# Patient Record
Sex: FEMALE | Race: WHITE | HISPANIC OR LATINO | URBAN - METROPOLITAN AREA
[De-identification: names, ages, dates, MRNs, and addresses within clinical notes are randomized per-mention and may not be internally consistent; named-entity substitution may affect disease eponyms.]

---

## 2018-03-08 ENCOUNTER — APPOINTMENT (RX ONLY)
Dept: URBAN - METROPOLITAN AREA CLINIC 31 | Facility: CLINIC | Age: 33
Setting detail: DERMATOLOGY
End: 2018-03-08

## 2018-03-08 DIAGNOSIS — B35.1 TINEA UNGUIUM: ICD-10-CM

## 2018-03-08 DIAGNOSIS — L70.0 ACNE VULGARIS: ICD-10-CM

## 2018-03-08 DIAGNOSIS — D22 MELANOCYTIC NEVI: ICD-10-CM

## 2018-03-08 PROBLEM — D22.39 MELANOCYTIC NEVI OF OTHER PARTS OF FACE: Status: ACTIVE | Noted: 2018-03-08

## 2018-03-08 PROBLEM — D22.4 MELANOCYTIC NEVI OF SCALP AND NECK: Status: ACTIVE | Noted: 2018-03-08

## 2018-03-08 PROCEDURE — ? PRESCRIPTION

## 2018-03-08 PROCEDURE — 99214 OFFICE O/P EST MOD 30 MIN: CPT

## 2018-03-08 PROCEDURE — ? COUNSELING

## 2018-03-08 RX ORDER — CICLOPIROX 80 MG/ML
SOLUTION TOPICAL QHS
Qty: 6.6 | Refills: 11 | Status: ERX | COMMUNITY
Start: 2018-03-08

## 2018-03-08 RX ORDER — CLINDAMYCIN PHOSPHATE 10 MG/G
GEL TOPICAL
Qty: 60 | Refills: 11 | Status: ERX | COMMUNITY
Start: 2018-03-08

## 2018-03-08 RX ADMIN — CICLOPIROX: 80 SOLUTION TOPICAL at 17:05

## 2018-03-08 RX ADMIN — CLINDAMYCIN PHOSPHATE: 10 GEL TOPICAL at 17:06

## 2018-03-08 ASSESSMENT — LOCATION DETAILED DESCRIPTION DERM
LOCATION DETAILED: RIGHT INFERIOR ANTERIOR NECK
LOCATION DETAILED: RIGHT SUPERIOR LATERAL BUCCAL CHEEK
LOCATION DETAILED: RIGHT GREAT TOENAIL
LOCATION DETAILED: LEFT GREAT TOENAIL
LOCATION DETAILED: LEFT INFERIOR CENTRAL MALAR CHEEK
LOCATION DETAILED: RIGHT SUPERIOR UPPER BACK

## 2018-03-08 ASSESSMENT — LOCATION SIMPLE DESCRIPTION DERM
LOCATION SIMPLE: LEFT GREAT TOE
LOCATION SIMPLE: RIGHT ANTERIOR NECK
LOCATION SIMPLE: RIGHT UPPER BACK
LOCATION SIMPLE: RIGHT CHEEK
LOCATION SIMPLE: RIGHT GREAT TOE
LOCATION SIMPLE: LEFT CHEEK

## 2018-03-08 ASSESSMENT — LOCATION ZONE DERM
LOCATION ZONE: TRUNK
LOCATION ZONE: NECK
LOCATION ZONE: TOENAIL
LOCATION ZONE: FACE

## 2018-03-08 NOTE — PROCEDURE: COUNSELING
Detail Level: Zone
Erythromycin Counseling:  I discussed with the patient the risks of erythromycin including but not limited to GI upset, allergic reaction, drug rash, diarrhea, increase in liver enzymes, and yeast infections.
Benzoyl Peroxide Pregnancy And Lactation Text: This medication is Pregnancy Category C. It is unknown if benzoyl peroxide is excreted in breast milk.
Topical Sulfur Applications Counseling: Topical Sulfur Counseling: Patient counseled that this medication may cause skin irritation or allergic reactions.  In the event of skin irritation, the patient was advised to reduce the amount of the drug applied or use it less frequently.   The patient verbalized understanding of the proper use and possible adverse effects of topical sulfur application.  All of the patient's questions and concerns were addressed.
Dapsone Counseling: I discussed with the patient the risks of dapsone including but not limited to hemolytic anemia, agranulocytosis, rashes, methemoglobinemia, kidney failure, peripheral neuropathy, headaches, GI upset, and liver toxicity.  Patients who start dapsone require monitoring including baseline LFTs and weekly CBCs for the first month, then every month thereafter.  The patient verbalized understanding of the proper use and possible adverse effects of dapsone.  All of the patient's questions and concerns were addressed.
Birth Control Pills Pregnancy And Lactation Text: This medication should be avoided if pregnant and for the first 30 days post-partum.
Minocycline Pregnancy And Lactation Text: This medication is Pregnancy Category D and not consider safe during pregnancy. It is also excreted in breast milk.
Dapsone Pregnancy And Lactation Text: This medication is Pregnancy Category C and is not considered safe during pregnancy or breast feeding.
Benzoyl Peroxide Counseling: Patient counseled that medicine may cause skin irritation and bleach clothing.  In the event of skin irritation, the patient was advised to reduce the amount of the drug applied or use it less frequently.   The patient verbalized understanding of the proper use and possible adverse effects of benzoyl peroxide.  All of the patient's questions and concerns were addressed.
Spironolactone Counseling: Patient advised regarding risks of diarrhea, abdominal pain, hyperkalemia, birth defects (for female patients), liver toxicity and renal toxicity. The patient may need blood work to monitor liver and kidney function and potassium levels while on therapy. The patient verbalized understanding of the proper use and possible adverse effects of spironolactone.  All of the patient's questions and concerns were addressed.
Topical Clindamycin Pregnancy And Lactation Text: This medication is Pregnancy Category B and is considered safe during pregnancy. It is unknown if it is excreted in breast milk.
Doxycycline Pregnancy And Lactation Text: This medication is Pregnancy Category D and not consider safe during pregnancy. It is also excreted in breast milk but is considered safe for shorter treatment courses.
Isotretinoin Pregnancy And Lactation Text: This medication is Pregnancy Category X and is considered extremely dangerous during pregnancy. It is unknown if it is excreted in breast milk.
Azithromycin Pregnancy And Lactation Text: This medication is considered safe during pregnancy and is also secreted in breast milk.
Doxycycline Counseling:  Patient counseled regarding possible photosensitivity and increased risk for sunburn.  Patient instructed to avoid sunlight, if possible.  When exposed to sunlight, patients should wear protective clothing, sunglasses, and sunscreen.  The patient was instructed to call the office immediately if the following severe adverse effects occur:  hearing changes, easy bruising/bleeding, severe headache, or vision changes.  The patient verbalized understanding of the proper use and possible adverse effects of doxycycline.  All of the patient's questions and concerns were addressed.
Birth Control Pills Counseling: Birth Control Pill Counseling: I discussed with the patient the potential side effects of OCPs including but not limited to increased risk of stroke, heart attack, thrombophlebitis, deep venous thrombosis, hepatic adenomas, breast changes, GI upset, headaches, and depression.  The patient verbalized understanding of the proper use and possible adverse effects of OCPs. All of the patient's questions and concerns were addressed.
Bactrim Pregnancy And Lactation Text: This medication is Pregnancy Category D and is known to cause fetal risk.  It is also excreted in breast milk.
High Dose Vitamin A Counseling: Side effects reviewed, pt to contact office should one occur.
Topical Retinoid Pregnancy And Lactation Text: This medication is Pregnancy Category C. It is unknown if this medication is excreted in breast milk.
Topical Clindamycin Counseling: Patient counseled that this medication may cause skin irritation or allergic reactions.  In the event of skin irritation, the patient was advised to reduce the amount of the drug applied or use it less frequently.   The patient verbalized understanding of the proper use and possible adverse effects of clindamycin.  All of the patient's questions and concerns were addressed.
Azithromycin Counseling:  I discussed with the patient the risks of azithromycin including but not limited to GI upset, allergic reaction, drug rash, diarrhea, and yeast infections.
Topical Retinoid counseling:  Patient advised to apply a pea-sized amount only at bedtime and wait 30 minutes after washing their face before applying.  If too drying, patient may add a non-comedogenic moisturizer. The patient verbalized understanding of the proper use and possible adverse effects of retinoids.  All of the patient's questions and concerns were addressed.
Bactrim Counseling:  I discussed with the patient the risks of sulfa antibiotics including but not limited to GI upset, allergic reaction, drug rash, diarrhea, dizziness, photosensitivity, and yeast infections.  Rarely, more serious reactions can occur including but not limited to aplastic anemia, agranulocytosis, methemoglobinemia, blood dyscrasias, liver or kidney failure, lung infiltrates or desquamative/blistering drug rashes.
Minocycline Counseling: Patient advised regarding possible photosensitivity and discoloration of the teeth, skin, lips, tongue and gums.  Patient instructed to avoid sunlight, if possible.  When exposed to sunlight, patients should wear protective clothing, sunglasses, and sunscreen.  The patient was instructed to call the office immediately if the following severe adverse effects occur:  hearing changes, easy bruising/bleeding, severe headache, or vision changes.  The patient verbalized understanding of the proper use and possible adverse effects of minocycline.  All of the patient's questions and concerns were addressed.
Erythromycin Pregnancy And Lactation Text: This medication is Pregnancy Category B and is considered safe during pregnancy. It is also excreted in breast milk.
Isotretinoin Counseling: Patient should get monthly blood tests, not donate blood, not drive at night if vision affected, not share medication, and not undergo elective surgery for 6 months after tx completed. Side effects reviewed, pt to contact office should one occur.
Tazorac Counseling:  Patient advised that medication is irritating and drying.  Patient may need to apply sparingly and wash off after an hour before eventually leaving it on overnight.  The patient verbalized understanding of the proper use and possible adverse effects of tazorac.  All of the patient's questions and concerns were addressed.
Tazorac Pregnancy And Lactation Text: This medication is not safe during pregnancy. It is unknown if this medication is excreted in breast milk.
High Dose Vitamin A Pregnancy And Lactation Text: High dose vitamin A therapy is contraindicated during pregnancy and breast feeding.
Tetracycline Counseling: Patient counseled regarding possible photosensitivity and increased risk for sunburn.  Patient instructed to avoid sunlight, if possible.  When exposed to sunlight, patients should wear protective clothing, sunglasses, and sunscreen.  The patient was instructed to call the office immediately if the following severe adverse effects occur:  hearing changes, easy bruising/bleeding, severe headache, or vision changes.  The patient verbalized understanding of the proper use and possible adverse effects of tetracycline.  All of the patient's questions and concerns were addressed. Patient understands to avoid pregnancy while on therapy due to potential birth defects.
Spironolactone Pregnancy And Lactation Text: This medication can cause feminization of the male fetus and should be avoided during pregnancy. The active metabolite is also found in breast milk.
Topical Sulfur Applications Pregnancy And Lactation Text: This medication is Pregnancy Category C and has an unknown safety profile during pregnancy. It is unknown if this topical medication is excreted in breast milk.

## 2018-06-12 ENCOUNTER — NON-PROVIDER VISIT (OUTPATIENT)
Dept: NEUROLOGY | Facility: MEDICAL CENTER | Age: 33
End: 2018-06-12
Payer: MEDICAID

## 2018-06-12 DIAGNOSIS — G56.03 CARPAL TUNNEL SYNDROME, BILATERAL: ICD-10-CM

## 2018-06-12 DIAGNOSIS — R20.0 NUMBNESS: ICD-10-CM

## 2018-06-12 DIAGNOSIS — R20.2 PARESTHESIA: ICD-10-CM

## 2018-06-12 PROCEDURE — 95910 NRV CNDJ TEST 7-8 STUDIES: CPT | Performed by: SPECIALIST

## 2018-06-12 PROCEDURE — 95886 MUSC TEST DONE W/N TEST COMP: CPT | Performed by: SPECIALIST

## 2018-06-12 NOTE — PROCEDURES
DATE OF SERVICE:  2018    DATE OF STUDY:  2018    ORDERING PROVIDER:  KASEY Mcgill    SUMMARY:  Nerve conduction studies of the right upper extremity revealed the   followin.  Right median motor distal latency, amplitude, and conduction velocity are   within normal limits.  2.  Right median sensory distal latency and amplitude are within normal   limits.  3.  Right median F-wave exhibits a normal distal latency.  4.  Right ulnar motor distal latency, amplitude, and conduction velocity are   within normal limits.  5.  Right ulnar sensory distal latency and amplitude are within normal limits.    Needle examination of selected muscles studied in the right upper extremity   reveals no acute or chronic denervation changes.  Muscles studied were right   deltoid, biceps, triceps, first dorsal interosseous, and abductor pollicis   brevis.    Nerve conduction studies of the left upper extremity revealed the followin.  Left median motor distal latency, amplitude, and conduction velocity are   within normal limits.  2.  Left median sensory distal latency and amplitude are within normal limits.  3.  Left median F-wave exhibits a normal distal latency.  4.  Left ulnar motor distal latency, amplitude, and conduction velocity are   within normal limits.  5.  Left ulnar sensory distal latency and amplitude are within normal limits.    Needle examination of selected muscles studied in the left upper extremity   reveals no acute or chronic denervation changes.  Muscles studied were left   deltoid, biceps, triceps, first dorsal interosseous, and abductor pollicis   brevis.  Nerve Conduction Studies     Stim Site NR Peak (ms) Norm Peak (ms) O-P Amp (µV) Norm O-P Amp Site1 Site2 Delta-P (ms) Dist (cm) Burke (m/s) Norm Burke (m/s)   Left Median Anti Sensory (2nd Digit)   Wrist    2.6 <3.4 75.6 >20 Wrist 2nd Digit 2.6 14.0 54 >50   Right Median Anti Sensory (2nd Digit)   Wrist    2.8 <3.4 64.1 >20 Wrist 2nd Digit  2.8 14.0 50 >50   Left Ulnar Anti Sensory (5th Digit)   Wrist    2.9 <3.1 72.1 >12 Wrist 5th Digit 2.9 14.0 *48 >50   Right Ulnar Anti Sensory (5th Digit)   Wrist    3.1 >3.1 68.4 >12 Wrist 5th Digit 3.1 14.0 *45 >50        Stim Site NR Onset (ms) Norm Onset (ms) O-P Amp (mV) Norm O-P Amp Site1 Site2 Delta-0 (ms) Dist (cm) Burke (m/s) Norm Burke (m/s)   Left Median Motor (Abd Poll Brev)   Wrist    2.7 <3.9 13.0 >6 Elbow Wrist 3.6 23.0 64 >50   Elbow    6.3  13.0          Right Median Motor (Abd Poll Brev)   Wrist    3.0 <3.9 8.4 >6 Elbow Wrist 3.7 26.0 70 >50   Elbow    6.7  8.2          Left Ulnar Motor (Abd Dig Min)   Wrist    2.3 <3.1 9.5 >7 B Elbow Wrist 2.6 18.0 69 >50   B Elbow    4.9  9.1  A Elbow B Elbow 1.3 10.0 77    A Elbow    6.2  9.2          Right Ulnar Motor (Abd Dig Min)   Wrist    2.3 <3.1 9.3 >7 B Elbow Wrist 2.6 16.5 63 >50   B Elbow    4.9  8.5  A Elbow B Elbow 1.2 10.0 83    A Elbow    6.1  8.3            F Wave Studies     NR F-Lat (ms) Lat Norm (ms)   Left Median (Abd Poll Brev)      22.66 <31   Right Median (Abd Poll Brev)      22.66 <31                               Electromyography     Side Muscle Nerve Root Ins Act Fibs Psw Amp Dur Poly Recrt Int Pat Comment   Left Deltoid Axillary C5-6 Nml Nml Nml Nml Nml 0 Nml Nml    Left Biceps Musculocut C5-6 Nml Nml Nml Nml Nml 0 Nml Nml    Left Triceps Radial C6-7-8 Nml Nml Nml Nml Nml 0 Nml Nml    Left Abd Poll Brev Median C8-T1 Nml Nml Nml Nml Nml 0 Nml Nml    Left 1stDorInt Ulnar C8-T1 Nml Nml Nml Nml Nml 0 Nml Nml    Right Deltoid Axillary C5-6 Nml Nml Nml Nml Nml 0 Nml Nml    Right Biceps Musculocut C5-6 Nml Nml Nml Nml Nml 0 Nml Nml    Right Triceps Radial C6-7-8 Nml Nml Nml Nml Nml 0 Nml Nml    Right Abd Poll Brev Median C8-T1 Nml Nml Nml Nml Nml 0 Nml Nml    Right 1stDorInt Ulnar C8-T1 Nml Nml Nml Nml Nml 0 Nml Nml        IMPRESSION:  Normal EMG and nerve conduction studies, bilateral upper   extremities.        ____________________________________     G MD ALEX BEDOYA    DD:  06/12/2018 14:00:33  DT:  06/12/2018 14:27:56    D#:  [id number]  Job#:  [id number]

## 2019-07-22 ENCOUNTER — APPOINTMENT (OUTPATIENT)
Dept: PULMONOLOGY | Facility: MEDICAL CENTER | Age: 34
End: 2019-07-22
Payer: MEDICAID

## 2019-08-12 ENCOUNTER — HOSPITAL ENCOUNTER (OUTPATIENT)
Dept: PULMONOLOGY | Facility: MEDICAL CENTER | Age: 34
End: 2019-08-12
Attending: PHYSICIAN ASSISTANT
Payer: MEDICAID

## 2019-08-12 PROCEDURE — 94060 EVALUATION OF WHEEZING: CPT | Mod: 26 | Performed by: INTERNAL MEDICINE

## 2019-08-12 PROCEDURE — 94726 PLETHYSMOGRAPHY LUNG VOLUMES: CPT

## 2019-08-12 PROCEDURE — 94729 DIFFUSING CAPACITY: CPT

## 2019-08-12 PROCEDURE — 94060 EVALUATION OF WHEEZING: CPT

## 2019-08-12 PROCEDURE — 94726 PLETHYSMOGRAPHY LUNG VOLUMES: CPT | Mod: 26 | Performed by: INTERNAL MEDICINE

## 2019-08-12 PROCEDURE — 94729 DIFFUSING CAPACITY: CPT | Mod: 26 | Performed by: INTERNAL MEDICINE

## 2019-08-12 ASSESSMENT — PULMONARY FUNCTION TESTS
FVC_PERCENT_PREDICTED: 97
FEV1: 2.82
FEV1: 2.76
FVC: 3.18
FEV1/FVC: 88
FEV1_LLN: 2.32
FEV1/FVC_PERCENT_PREDICTED: 104
FEV1_PERCENT_PREDICTED: 101
FEV1/FVC: 88
FEV1/FVC_PREDICTED: 85
FEV1/FVC_PERCENT_PREDICTED: 103
FEV1_PERCENT_PREDICTED: 99
FEV1_PERCENT_CHANGE: 2
FEV1/FVC_PERCENT_LLN: 71
FVC_LLN: 2.73
FVC_LLN: 2.73
FEV1/FVC_PERCENT_PREDICTED: 85
FEV1_LLN: 2.32
FEV1/FVC_PERCENT_PREDICTED: 104
FEV1/FVC_PERCENT_PREDICTED: 103
FVC_PREDICTED: 3.27
FEV1_PERCENT_CHANGE: 0
FEV1/FVC: 88.68
FVC: 3.15
FVC_PERCENT_PREDICTED: 96
FEV1/FVC_PERCENT_CHANGE: 1
FEV1/FVC: 89
FEV1/FVC_PERCENT_LLN: 71
FEV1_PREDICTED: 2.78

## 2019-08-23 NOTE — PROCEDURES
PULMONARY FUNCTION TEST INTERPRETATION    REQUESTING PROVIDER:  NINI Barrett    REASON FOR REQUEST:  Shortness of breath.    FINDINGS:  1.  Acceptable and reproducible.  2.  FEV1 2.76 liters (99%), FVC 3.15 liters (96%), ratio 88%.  3.  Flow volume loops are normal appearing.  4.  TLC normal at 5.09 liters (114%).  5.  DLCO corrected for hemoglobin, elevated at 13.21 mL/min/mmHg (154%).    IMPRESSION:  Normal spirometry.  No response to bronchodilator.  Evidence of   air trapping and elevated DLCO.  Reduced expiratory reserve volume.  This is   all related to elevated BMI.  Clinically correlate.       ____________________________________     MD SHER Cole / MAURIZIO    DD:  08/23/2019 08:56:07  DT:  08/23/2019 09:46:56    D#:  [id number]  Job#:  [id number]    cc: Addie TERRAZAS

## 2022-03-09 ENCOUNTER — APPOINTMENT (OUTPATIENT)
Dept: ADMISSIONS | Facility: MEDICAL CENTER | Age: 37
DRG: 621 | End: 2022-03-09
Payer: MEDICAID

## 2022-03-14 ENCOUNTER — PRE-ADMISSION TESTING (OUTPATIENT)
Dept: ADMISSIONS | Facility: MEDICAL CENTER | Age: 37
DRG: 621 | End: 2022-03-14
Attending: COLON & RECTAL SURGERY
Payer: MEDICAID

## 2022-03-14 DIAGNOSIS — Z01.812 PRE-OPERATIVE LABORATORY EXAMINATION: ICD-10-CM

## 2022-03-14 LAB — COVID ORDER STATUS COVID19: NORMAL

## 2022-03-14 PROCEDURE — U0005 INFEC AGEN DETEC AMPLI PROBE: HCPCS

## 2022-03-14 PROCEDURE — U0003 INFECTIOUS AGENT DETECTION BY NUCLEIC ACID (DNA OR RNA); SEVERE ACUTE RESPIRATORY SYNDROME CORONAVIRUS 2 (SARS-COV-2) (CORONAVIRUS DISEASE [COVID-19]), AMPLIFIED PROBE TECHNIQUE, MAKING USE OF HIGH THROUGHPUT TECHNOLOGIES AS DESCRIBED BY CMS-2020-01-R: HCPCS

## 2022-03-14 RX ORDER — EFINACONAZOLE 100 MG/ML
1 SOLUTION TOPICAL DAILY
COMMUNITY
Start: 2022-01-25

## 2022-03-14 RX ORDER — BETAMETHASONE DIPROPIONATE 0.05 %
1 OINTMENT (GRAM) TOPICAL EVERY EVENING
COMMUNITY

## 2022-03-14 RX ORDER — MEDROXYPROGESTERONE ACETATE 150 MG/ML
50 INJECTION, SUSPENSION INTRAMUSCULAR
COMMUNITY
Start: 2022-02-09

## 2022-03-14 RX ORDER — CLOBETASOL PROPIONATE 0.46 MG/ML
SOLUTION TOPICAL PRN
COMMUNITY

## 2022-03-14 RX ORDER — TRIAMCINOLONE ACETONIDE 5 MG/G
CREAM TOPICAL
Status: ON HOLD | COMMUNITY
End: 2022-03-18

## 2022-03-15 LAB
SARS-COV-2 RNA RESP QL NAA+PROBE: NOTDETECTED
SPECIMEN SOURCE: NORMAL

## 2022-03-16 ENCOUNTER — PRE-ADMISSION TESTING (OUTPATIENT)
Dept: ADMISSIONS | Facility: MEDICAL CENTER | Age: 37
DRG: 621 | End: 2022-03-16
Attending: COLON & RECTAL SURGERY
Payer: MEDICAID

## 2022-03-16 DIAGNOSIS — Z01.812 PRE-OPERATIVE LABORATORY EXAMINATION: ICD-10-CM

## 2022-03-16 LAB
25(OH)D3 SERPL-MCNC: 28 NG/ML (ref 30–100)
ALBUMIN SERPL BCP-MCNC: 4.6 G/DL (ref 3.2–4.9)
ALBUMIN/GLOB SERPL: 1.3 G/DL
ALP SERPL-CCNC: 92 U/L (ref 30–99)
ALT SERPL-CCNC: 25 U/L (ref 2–50)
ANION GAP SERPL CALC-SCNC: 11 MMOL/L (ref 7–16)
AST SERPL-CCNC: 7 U/L (ref 12–45)
BASOPHILS # BLD AUTO: 0.5 % (ref 0–1.8)
BASOPHILS # BLD: 0.03 K/UL (ref 0–0.12)
BILIRUB SERPL-MCNC: 0.5 MG/DL (ref 0.1–1.5)
BUN SERPL-MCNC: 18 MG/DL (ref 8–22)
CALCIUM SERPL-MCNC: 9.5 MG/DL (ref 8.5–10.5)
CHLORIDE SERPL-SCNC: 103 MMOL/L (ref 96–112)
CHOLEST SERPL-MCNC: 160 MG/DL (ref 100–199)
CO2 SERPL-SCNC: 24 MMOL/L (ref 20–33)
CREAT SERPL-MCNC: 0.58 MG/DL (ref 0.5–1.4)
EOSINOPHIL # BLD AUTO: 0.12 K/UL (ref 0–0.51)
EOSINOPHIL NFR BLD: 1.9 % (ref 0–6.9)
ERYTHROCYTE [DISTWIDTH] IN BLOOD BY AUTOMATED COUNT: 42.1 FL (ref 35.9–50)
FERRITIN SERPL-MCNC: 147 NG/ML (ref 10–291)
FOLATE SERPL-MCNC: 7 NG/ML
GFR SERPLBLD CREATININE-BSD FMLA CKD-EPI: 121 ML/MIN/1.73 M 2
GLOBULIN SER CALC-MCNC: 3.5 G/DL (ref 1.9–3.5)
GLUCOSE SERPL-MCNC: 105 MG/DL (ref 65–99)
HCG SERPL QL: NEGATIVE
HCT VFR BLD AUTO: 46.6 % (ref 37–47)
HDLC SERPL-MCNC: 36 MG/DL
HGB BLD-MCNC: 15.3 G/DL (ref 12–16)
IMM GRANULOCYTES # BLD AUTO: 0.02 K/UL (ref 0–0.11)
IMM GRANULOCYTES NFR BLD AUTO: 0.3 % (ref 0–0.9)
INR PPP: 0.98 (ref 0.87–1.13)
IRON SATN MFR SERPL: 18 % (ref 15–55)
IRON SERPL-MCNC: 55 UG/DL (ref 40–170)
LDLC SERPL CALC-MCNC: 104 MG/DL
LYMPHOCYTES # BLD AUTO: 2.16 K/UL (ref 1–4.8)
LYMPHOCYTES NFR BLD: 33.5 % (ref 22–41)
MCH RBC QN AUTO: 29.5 PG (ref 27–33)
MCHC RBC AUTO-ENTMCNC: 32.8 G/DL (ref 33.6–35)
MCV RBC AUTO: 89.8 FL (ref 81.4–97.8)
MONOCYTES # BLD AUTO: 0.42 K/UL (ref 0–0.85)
MONOCYTES NFR BLD AUTO: 6.5 % (ref 0–13.4)
NEUTROPHILS # BLD AUTO: 3.69 K/UL (ref 2–7.15)
NEUTROPHILS NFR BLD: 57.3 % (ref 44–72)
NRBC # BLD AUTO: 0 K/UL
NRBC BLD-RTO: 0 /100 WBC
PLATELET # BLD AUTO: 274 K/UL (ref 164–446)
PMV BLD AUTO: 10.1 FL (ref 9–12.9)
POTASSIUM SERPL-SCNC: 4.2 MMOL/L (ref 3.6–5.5)
PREALB SERPL-MCNC: 23.1 MG/DL (ref 18–38)
PROT SERPL-MCNC: 8.1 G/DL (ref 6–8.2)
PROTHROMBIN TIME: 12.7 SEC (ref 12–14.6)
RBC # BLD AUTO: 5.19 M/UL (ref 4.2–5.4)
SODIUM SERPL-SCNC: 138 MMOL/L (ref 135–145)
TIBC SERPL-MCNC: 306 UG/DL (ref 250–450)
TRANSFERRIN SERPL-MCNC: 256 MG/DL (ref 200–370)
TRIGL SERPL-MCNC: 98 MG/DL (ref 0–149)
UIBC SERPL-MCNC: 251 UG/DL (ref 110–370)
VIT B12 SERPL-MCNC: 714 PG/ML (ref 211–911)
WBC # BLD AUTO: 6.4 K/UL (ref 4.8–10.8)

## 2022-03-16 PROCEDURE — 36415 COLL VENOUS BLD VENIPUNCTURE: CPT

## 2022-03-16 PROCEDURE — 84703 CHORIONIC GONADOTROPIN ASSAY: CPT

## 2022-03-16 PROCEDURE — 84425 ASSAY OF VITAMIN B-1: CPT

## 2022-03-16 PROCEDURE — 80053 COMPREHEN METABOLIC PANEL: CPT

## 2022-03-16 PROCEDURE — 82306 VITAMIN D 25 HYDROXY: CPT

## 2022-03-16 PROCEDURE — 84134 ASSAY OF PREALBUMIN: CPT

## 2022-03-16 PROCEDURE — 84207 ASSAY OF VITAMIN B-6: CPT

## 2022-03-16 PROCEDURE — 85610 PROTHROMBIN TIME: CPT

## 2022-03-16 PROCEDURE — 84466 ASSAY OF TRANSFERRIN: CPT

## 2022-03-16 PROCEDURE — 83540 ASSAY OF IRON: CPT

## 2022-03-16 PROCEDURE — 82728 ASSAY OF FERRITIN: CPT

## 2022-03-16 PROCEDURE — 82746 ASSAY OF FOLIC ACID SERUM: CPT

## 2022-03-16 PROCEDURE — 82607 VITAMIN B-12: CPT

## 2022-03-16 PROCEDURE — 84630 ASSAY OF ZINC: CPT

## 2022-03-16 PROCEDURE — 80061 LIPID PANEL: CPT

## 2022-03-16 PROCEDURE — 85025 COMPLETE CBC W/AUTO DIFF WBC: CPT

## 2022-03-16 PROCEDURE — 83550 IRON BINDING TEST: CPT

## 2022-03-18 ENCOUNTER — ANESTHESIA (OUTPATIENT)
Dept: SURGERY | Facility: MEDICAL CENTER | Age: 37
DRG: 621 | End: 2022-03-18
Payer: MEDICAID

## 2022-03-18 ENCOUNTER — HOSPITAL ENCOUNTER (INPATIENT)
Facility: MEDICAL CENTER | Age: 37
LOS: 2 days | DRG: 621 | End: 2022-03-20
Attending: COLON & RECTAL SURGERY | Admitting: COLON & RECTAL SURGERY
Payer: MEDICAID

## 2022-03-18 ENCOUNTER — ANESTHESIA EVENT (OUTPATIENT)
Dept: SURGERY | Facility: MEDICAL CENTER | Age: 37
DRG: 621 | End: 2022-03-18
Payer: MEDICAID

## 2022-03-18 PROBLEM — E66.01 MORBID OBESITY (HCC): Status: ACTIVE | Noted: 2022-03-18

## 2022-03-18 LAB
PATHOLOGY CONSULT NOTE: NORMAL
ZINC BLD-MCNC: 772.8 UG/DL (ref 440–860)

## 2022-03-18 PROCEDURE — 160041 HCHG SURGERY MINUTES - EA ADDL 1 MIN LEVEL 4: Performed by: COLON & RECTAL SURGERY

## 2022-03-18 PROCEDURE — 700111 HCHG RX REV CODE 636 W/ 250 OVERRIDE (IP): Performed by: ANESTHESIOLOGY

## 2022-03-18 PROCEDURE — 700102 HCHG RX REV CODE 250 W/ 637 OVERRIDE(OP): Performed by: ANESTHESIOLOGY

## 2022-03-18 PROCEDURE — 160009 HCHG ANES TIME/MIN: Performed by: COLON & RECTAL SURGERY

## 2022-03-18 PROCEDURE — 700111 HCHG RX REV CODE 636 W/ 250 OVERRIDE (IP): Performed by: PHYSICIAN ASSISTANT

## 2022-03-18 PROCEDURE — 502570 HCHG PACK, GASTRIC BANDING: Performed by: COLON & RECTAL SURGERY

## 2022-03-18 PROCEDURE — 160036 HCHG PACU - EA ADDL 30 MINS PHASE I: Performed by: COLON & RECTAL SURGERY

## 2022-03-18 PROCEDURE — 700102 HCHG RX REV CODE 250 W/ 637 OVERRIDE(OP): Performed by: COLON & RECTAL SURGERY

## 2022-03-18 PROCEDURE — 501570 HCHG TROCAR, SEPARATOR: Performed by: COLON & RECTAL SURGERY

## 2022-03-18 PROCEDURE — 160029 HCHG SURGERY MINUTES - 1ST 30 MINS LEVEL 4: Performed by: COLON & RECTAL SURGERY

## 2022-03-18 PROCEDURE — 700111 HCHG RX REV CODE 636 W/ 250 OVERRIDE (IP): Performed by: COLON & RECTAL SURGERY

## 2022-03-18 PROCEDURE — 700101 HCHG RX REV CODE 250: Performed by: COLON & RECTAL SURGERY

## 2022-03-18 PROCEDURE — A9270 NON-COVERED ITEM OR SERVICE: HCPCS | Performed by: COLON & RECTAL SURGERY

## 2022-03-18 PROCEDURE — 501399 HCHG SPECIMAN BAG, ENDO CATC: Performed by: COLON & RECTAL SURGERY

## 2022-03-18 PROCEDURE — 770001 HCHG ROOM/CARE - MED/SURG/GYN PRIV*

## 2022-03-18 PROCEDURE — 700102 HCHG RX REV CODE 250 W/ 637 OVERRIDE(OP): Performed by: PHYSICIAN ASSISTANT

## 2022-03-18 PROCEDURE — 88307 TISSUE EXAM BY PATHOLOGIST: CPT

## 2022-03-18 PROCEDURE — 501838 HCHG SUTURE GENERAL: Performed by: COLON & RECTAL SURGERY

## 2022-03-18 PROCEDURE — 501583 HCHG TROCAR, THRD CAN&SEAL 5X100: Performed by: COLON & RECTAL SURGERY

## 2022-03-18 PROCEDURE — 700101 HCHG RX REV CODE 250: Performed by: PHYSICIAN ASSISTANT

## 2022-03-18 PROCEDURE — 0DB64Z3 EXCISION OF STOMACH, PERCUTANEOUS ENDOSCOPIC APPROACH, VERTICAL: ICD-10-PCS | Performed by: COLON & RECTAL SURGERY

## 2022-03-18 PROCEDURE — A9270 NON-COVERED ITEM OR SERVICE: HCPCS | Performed by: PHYSICIAN ASSISTANT

## 2022-03-18 PROCEDURE — 501497 HCHG SURGICLIP: Performed by: COLON & RECTAL SURGERY

## 2022-03-18 PROCEDURE — 160002 HCHG RECOVERY MINUTES (STAT): Performed by: COLON & RECTAL SURGERY

## 2022-03-18 PROCEDURE — 700105 HCHG RX REV CODE 258: Performed by: COLON & RECTAL SURGERY

## 2022-03-18 PROCEDURE — 700101 HCHG RX REV CODE 250: Performed by: ANESTHESIOLOGY

## 2022-03-18 PROCEDURE — 160035 HCHG PACU - 1ST 60 MINS PHASE I: Performed by: COLON & RECTAL SURGERY

## 2022-03-18 PROCEDURE — A9270 NON-COVERED ITEM OR SERVICE: HCPCS | Performed by: ANESTHESIOLOGY

## 2022-03-18 PROCEDURE — 700111 HCHG RX REV CODE 636 W/ 250 OVERRIDE (IP)

## 2022-03-18 PROCEDURE — 160048 HCHG OR STATISTICAL LEVEL 1-5: Performed by: COLON & RECTAL SURGERY

## 2022-03-18 RX ORDER — ACETAMINOPHEN 500 MG
1000 TABLET ORAL EVERY 8 HOURS
Status: DISCONTINUED | OUTPATIENT
Start: 2022-03-19 | End: 2022-03-20 | Stop reason: HOSPADM

## 2022-03-18 RX ORDER — ALBUTEROL SULFATE 90 UG/1
2 AEROSOL, METERED RESPIRATORY (INHALATION) EVERY 4 HOURS PRN
Status: DISCONTINUED | OUTPATIENT
Start: 2022-03-18 | End: 2022-03-20 | Stop reason: HOSPADM

## 2022-03-18 RX ORDER — MOMETASONE FUROATE 50 UG/1
2 SPRAY, METERED NASAL DAILY
Status: DISCONTINUED | OUTPATIENT
Start: 2022-03-18 | End: 2022-03-18

## 2022-03-18 RX ORDER — DIPHENHYDRAMINE HYDROCHLORIDE 50 MG/ML
12.5 INJECTION INTRAMUSCULAR; INTRAVENOUS EVERY 6 HOURS PRN
Status: DISCONTINUED | OUTPATIENT
Start: 2022-03-18 | End: 2022-03-20 | Stop reason: HOSPADM

## 2022-03-18 RX ORDER — ONDANSETRON 2 MG/ML
4 INJECTION INTRAMUSCULAR; INTRAVENOUS
Status: COMPLETED | OUTPATIENT
Start: 2022-03-18 | End: 2022-03-18

## 2022-03-18 RX ORDER — HYDROMORPHONE HYDROCHLORIDE 1 MG/ML
0.4 INJECTION, SOLUTION INTRAMUSCULAR; INTRAVENOUS; SUBCUTANEOUS
Status: DISCONTINUED | OUTPATIENT
Start: 2022-03-18 | End: 2022-03-18 | Stop reason: HOSPADM

## 2022-03-18 RX ORDER — ACETAMINOPHEN 10 MG/ML
1 INJECTION, SOLUTION INTRAVENOUS ONCE
Status: COMPLETED | OUTPATIENT
Start: 2022-03-18 | End: 2022-03-18

## 2022-03-18 RX ORDER — OXYCODONE HCL 5 MG/5 ML
5 SOLUTION, ORAL ORAL
Status: DISCONTINUED | OUTPATIENT
Start: 2022-03-18 | End: 2022-03-20 | Stop reason: HOSPADM

## 2022-03-18 RX ORDER — FLUTICASONE PROPIONATE 50 MCG
2 SPRAY, SUSPENSION (ML) NASAL DAILY
Status: DISCONTINUED | OUTPATIENT
Start: 2022-03-19 | End: 2022-03-20 | Stop reason: HOSPADM

## 2022-03-18 RX ORDER — SODIUM CHLORIDE, SODIUM LACTATE, POTASSIUM CHLORIDE, CALCIUM CHLORIDE 600; 310; 30; 20 MG/100ML; MG/100ML; MG/100ML; MG/100ML
INJECTION, SOLUTION INTRAVENOUS CONTINUOUS
Status: DISCONTINUED | OUTPATIENT
Start: 2022-03-18 | End: 2022-03-18 | Stop reason: HOSPADM

## 2022-03-18 RX ORDER — HYDROMORPHONE HYDROCHLORIDE 1 MG/ML
0.1 INJECTION, SOLUTION INTRAMUSCULAR; INTRAVENOUS; SUBCUTANEOUS
Status: DISCONTINUED | OUTPATIENT
Start: 2022-03-18 | End: 2022-03-18 | Stop reason: HOSPADM

## 2022-03-18 RX ORDER — ACETAMINOPHEN 500 MG
1000 TABLET ORAL EVERY 6 HOURS PRN
Status: DISCONTINUED | OUTPATIENT
Start: 2022-03-23 | End: 2022-03-20 | Stop reason: HOSPADM

## 2022-03-18 RX ORDER — PROMETHAZINE HYDROCHLORIDE 25 MG/1
25 SUPPOSITORY RECTAL EVERY 4 HOURS PRN
Status: DISCONTINUED | OUTPATIENT
Start: 2022-03-18 | End: 2022-03-20 | Stop reason: HOSPADM

## 2022-03-18 RX ORDER — DIPHENHYDRAMINE HCL 25 MG
25 TABLET ORAL EVERY 6 HOURS PRN
Status: DISCONTINUED | OUTPATIENT
Start: 2022-03-18 | End: 2022-03-20 | Stop reason: HOSPADM

## 2022-03-18 RX ORDER — SODIUM CHLORIDE, SODIUM LACTATE, POTASSIUM CHLORIDE, AND CALCIUM CHLORIDE .6; .31; .03; .02 G/100ML; G/100ML; G/100ML; G/100ML
500 INJECTION, SOLUTION INTRAVENOUS
Status: DISCONTINUED | OUTPATIENT
Start: 2022-03-18 | End: 2022-03-20 | Stop reason: HOSPADM

## 2022-03-18 RX ORDER — HALOPERIDOL 5 MG/ML
1 INJECTION INTRAMUSCULAR EVERY 6 HOURS PRN
Status: DISCONTINUED | OUTPATIENT
Start: 2022-03-18 | End: 2022-03-19

## 2022-03-18 RX ORDER — DIPHENHYDRAMINE HYDROCHLORIDE 50 MG/ML
25 INJECTION INTRAMUSCULAR; INTRAVENOUS EVERY 6 HOURS PRN
Status: DISCONTINUED | OUTPATIENT
Start: 2022-03-18 | End: 2022-03-20 | Stop reason: HOSPADM

## 2022-03-18 RX ORDER — ONDANSETRON 2 MG/ML
INJECTION INTRAMUSCULAR; INTRAVENOUS PRN
Status: DISCONTINUED | OUTPATIENT
Start: 2022-03-18 | End: 2022-03-18 | Stop reason: SURG

## 2022-03-18 RX ORDER — OXYCODONE HCL 10 MG/1
10 TABLET, FILM COATED, EXTENDED RELEASE ORAL ONCE
Status: COMPLETED | OUTPATIENT
Start: 2022-03-18 | End: 2022-03-18

## 2022-03-18 RX ORDER — HYDROMORPHONE HYDROCHLORIDE 1 MG/ML
0.2 INJECTION, SOLUTION INTRAMUSCULAR; INTRAVENOUS; SUBCUTANEOUS
Status: DISCONTINUED | OUTPATIENT
Start: 2022-03-18 | End: 2022-03-18 | Stop reason: HOSPADM

## 2022-03-18 RX ORDER — DIPHENHYDRAMINE HYDROCHLORIDE 50 MG/ML
12.5 INJECTION INTRAMUSCULAR; INTRAVENOUS
Status: DISCONTINUED | OUTPATIENT
Start: 2022-03-18 | End: 2022-03-18 | Stop reason: HOSPADM

## 2022-03-18 RX ORDER — HYDROMORPHONE HYDROCHLORIDE 2 MG/ML
0.5 INJECTION, SOLUTION INTRAMUSCULAR; INTRAVENOUS; SUBCUTANEOUS
Status: DISCONTINUED | OUTPATIENT
Start: 2022-03-18 | End: 2022-03-20 | Stop reason: HOSPADM

## 2022-03-18 RX ORDER — BUDESONIDE AND FORMOTEROL FUMARATE DIHYDRATE 80; 4.5 UG/1; UG/1
2 AEROSOL RESPIRATORY (INHALATION)
Status: DISCONTINUED | OUTPATIENT
Start: 2022-03-18 | End: 2022-03-20 | Stop reason: HOSPADM

## 2022-03-18 RX ORDER — OXYCODONE HCL 5 MG/5 ML
5 SOLUTION, ORAL ORAL
Status: COMPLETED | OUTPATIENT
Start: 2022-03-18 | End: 2022-03-18

## 2022-03-18 RX ORDER — ONDANSETRON 2 MG/ML
4 INJECTION INTRAMUSCULAR; INTRAVENOUS EVERY 4 HOURS PRN
Status: DISCONTINUED | OUTPATIENT
Start: 2022-03-18 | End: 2022-03-20 | Stop reason: HOSPADM

## 2022-03-18 RX ORDER — ENALAPRILAT 1.25 MG/ML
2.5 INJECTION INTRAVENOUS EVERY 6 HOURS PRN
Status: DISCONTINUED | OUTPATIENT
Start: 2022-03-18 | End: 2022-03-20 | Stop reason: HOSPADM

## 2022-03-18 RX ORDER — SODIUM CHLORIDE AND POTASSIUM CHLORIDE 150; 900 MG/100ML; MG/100ML
INJECTION, SOLUTION INTRAVENOUS CONTINUOUS
Status: DISCONTINUED | OUTPATIENT
Start: 2022-03-18 | End: 2022-03-20 | Stop reason: HOSPADM

## 2022-03-18 RX ORDER — OXYCODONE HCL 5 MG/5 ML
10 SOLUTION, ORAL ORAL
Status: DISCONTINUED | OUTPATIENT
Start: 2022-03-18 | End: 2022-03-20 | Stop reason: HOSPADM

## 2022-03-18 RX ORDER — SCOLOPAMINE TRANSDERMAL SYSTEM 1 MG/1
1 PATCH, EXTENDED RELEASE TRANSDERMAL
Status: DISCONTINUED | OUTPATIENT
Start: 2022-03-18 | End: 2022-03-20 | Stop reason: HOSPADM

## 2022-03-18 RX ORDER — HALOPERIDOL 5 MG/ML
1 INJECTION INTRAMUSCULAR
Status: DISCONTINUED | OUTPATIENT
Start: 2022-03-18 | End: 2022-03-18 | Stop reason: HOSPADM

## 2022-03-18 RX ORDER — MEPERIDINE HYDROCHLORIDE 25 MG/ML
INJECTION INTRAMUSCULAR; INTRAVENOUS; SUBCUTANEOUS PRN
Status: DISCONTINUED | OUTPATIENT
Start: 2022-03-18 | End: 2022-03-18 | Stop reason: SURG

## 2022-03-18 RX ORDER — OXYCODONE HCL 5 MG/5 ML
10 SOLUTION, ORAL ORAL
Status: COMPLETED | OUTPATIENT
Start: 2022-03-18 | End: 2022-03-18

## 2022-03-18 RX ORDER — BUPIVACAINE HYDROCHLORIDE AND EPINEPHRINE 5; 5 MG/ML; UG/ML
INJECTION, SOLUTION PERINEURAL
Status: DISCONTINUED | OUTPATIENT
Start: 2022-03-18 | End: 2022-03-18 | Stop reason: HOSPADM

## 2022-03-18 RX ORDER — MIDAZOLAM HYDROCHLORIDE 1 MG/ML
INJECTION INTRAMUSCULAR; INTRAVENOUS
Status: COMPLETED
Start: 2022-03-18 | End: 2022-03-18

## 2022-03-18 RX ORDER — GABAPENTIN 300 MG/1
300 CAPSULE ORAL ONCE
Status: COMPLETED | OUTPATIENT
Start: 2022-03-18 | End: 2022-03-18

## 2022-03-18 RX ORDER — DEXAMETHASONE SODIUM PHOSPHATE 4 MG/ML
INJECTION, SOLUTION INTRA-ARTICULAR; INTRALESIONAL; INTRAMUSCULAR; INTRAVENOUS; SOFT TISSUE PRN
Status: DISCONTINUED | OUTPATIENT
Start: 2022-03-18 | End: 2022-03-18 | Stop reason: SURG

## 2022-03-18 RX ORDER — MIDAZOLAM HYDROCHLORIDE 1 MG/ML
1 INJECTION INTRAMUSCULAR; INTRAVENOUS ONCE
Status: COMPLETED | OUTPATIENT
Start: 2022-03-18 | End: 2022-03-18

## 2022-03-18 RX ORDER — SODIUM CHLORIDE, SODIUM LACTATE, POTASSIUM CHLORIDE, CALCIUM CHLORIDE 600; 310; 30; 20 MG/100ML; MG/100ML; MG/100ML; MG/100ML
INJECTION, SOLUTION INTRAVENOUS CONTINUOUS
Status: ACTIVE | OUTPATIENT
Start: 2022-03-18 | End: 2022-03-18

## 2022-03-18 RX ORDER — ACETAMINOPHEN 10 MG/ML
1000 INJECTION, SOLUTION INTRAVENOUS EVERY 6 HOURS
Status: COMPLETED | OUTPATIENT
Start: 2022-03-18 | End: 2022-03-18

## 2022-03-18 RX ORDER — SIMETHICONE 125 MG
125 TABLET,CHEWABLE ORAL 3 TIMES DAILY PRN
Status: DISCONTINUED | OUTPATIENT
Start: 2022-03-18 | End: 2022-03-20 | Stop reason: HOSPADM

## 2022-03-18 RX ORDER — CALCIUM CARBONATE 500 MG/1
500 TABLET, CHEWABLE ORAL
Status: DISCONTINUED | OUTPATIENT
Start: 2022-03-18 | End: 2022-03-20 | Stop reason: HOSPADM

## 2022-03-18 RX ORDER — GABAPENTIN 300 MG/1
300 CAPSULE ORAL 3 TIMES DAILY
Status: DISCONTINUED | OUTPATIENT
Start: 2022-03-18 | End: 2022-03-20 | Stop reason: HOSPADM

## 2022-03-18 RX ORDER — MEPERIDINE HYDROCHLORIDE 25 MG/ML
6.25 INJECTION INTRAMUSCULAR; INTRAVENOUS; SUBCUTANEOUS
Status: DISCONTINUED | OUTPATIENT
Start: 2022-03-18 | End: 2022-03-18 | Stop reason: HOSPADM

## 2022-03-18 RX ORDER — CEFAZOLIN SODIUM 1 G/3ML
INJECTION, POWDER, FOR SOLUTION INTRAMUSCULAR; INTRAVENOUS PRN
Status: DISCONTINUED | OUTPATIENT
Start: 2022-03-18 | End: 2022-03-18 | Stop reason: SURG

## 2022-03-18 RX ORDER — LIDOCAINE HYDROCHLORIDE 40 MG/ML
SOLUTION TOPICAL PRN
Status: DISCONTINUED | OUTPATIENT
Start: 2022-03-18 | End: 2022-03-18 | Stop reason: SURG

## 2022-03-18 RX ADMIN — SUGAMMADEX 200 MG: 100 INJECTION, SOLUTION INTRAVENOUS at 13:03

## 2022-03-18 RX ADMIN — GABAPENTIN 300 MG: 300 CAPSULE ORAL at 18:33

## 2022-03-18 RX ADMIN — SUGAMMADEX 200 MG: 100 INJECTION, SOLUTION INTRAVENOUS at 12:52

## 2022-03-18 RX ADMIN — FENTANYL CITRATE 100 MCG: 50 INJECTION, SOLUTION INTRAMUSCULAR; INTRAVENOUS at 12:04

## 2022-03-18 RX ADMIN — ONDANSETRON 4 MG: 2 INJECTION INTRAMUSCULAR; INTRAVENOUS at 17:53

## 2022-03-18 RX ADMIN — FENTANYL CITRATE 50 MCG: 50 INJECTION INTRAMUSCULAR; INTRAVENOUS at 13:20

## 2022-03-18 RX ADMIN — OXYCODONE HYDROCHLORIDE 10 MG: 5 SOLUTION ORAL at 13:20

## 2022-03-18 RX ADMIN — CEFAZOLIN 2 G: 330 INJECTION, POWDER, FOR SOLUTION INTRAMUSCULAR; INTRAVENOUS at 12:05

## 2022-03-18 RX ADMIN — HYDROMORPHONE HYDROCHLORIDE 0.2 MG: 1 INJECTION, SOLUTION INTRAMUSCULAR; INTRAVENOUS; SUBCUTANEOUS at 16:36

## 2022-03-18 RX ADMIN — FENTANYL CITRATE 50 MCG: 50 INJECTION INTRAMUSCULAR; INTRAVENOUS at 13:30

## 2022-03-18 RX ADMIN — HYDROMORPHONE HYDROCHLORIDE 0.4 MG: 1 INJECTION, SOLUTION INTRAMUSCULAR; INTRAVENOUS; SUBCUTANEOUS at 14:00

## 2022-03-18 RX ADMIN — HALOPERIDOL LACTATE 1 MG: 5 INJECTION, SOLUTION INTRAMUSCULAR at 14:05

## 2022-03-18 RX ADMIN — SODIUM CHLORIDE, POTASSIUM CHLORIDE, SODIUM LACTATE AND CALCIUM CHLORIDE: 600; 310; 30; 20 INJECTION, SOLUTION INTRAVENOUS at 11:20

## 2022-03-18 RX ADMIN — MEPERIDINE HYDROCHLORIDE 25 MG: 25 INJECTION INTRAMUSCULAR; INTRAVENOUS; SUBCUTANEOUS at 12:51

## 2022-03-18 RX ADMIN — FAMOTIDINE 20 MG: 10 INJECTION INTRAVENOUS at 18:35

## 2022-03-18 RX ADMIN — BUDESONIDE AND FORMOTEROL FUMARATE DIHYDRATE 2 PUFF: 80; 4.5 AEROSOL RESPIRATORY (INHALATION) at 23:27

## 2022-03-18 RX ADMIN — MIDAZOLAM HYDROCHLORIDE 1 MG: 1 INJECTION INTRAMUSCULAR; INTRAVENOUS at 14:18

## 2022-03-18 RX ADMIN — DEXAMETHASONE SODIUM PHOSPHATE 4 MG: 4 INJECTION, SOLUTION INTRA-ARTICULAR; INTRALESIONAL; INTRAMUSCULAR; INTRAVENOUS; SOFT TISSUE at 12:15

## 2022-03-18 RX ADMIN — MIDAZOLAM 1 MG: 1 INJECTION, SOLUTION INTRAMUSCULAR; INTRAVENOUS at 14:18

## 2022-03-18 RX ADMIN — HYDROMORPHONE HYDROCHLORIDE 0.4 MG: 1 INJECTION, SOLUTION INTRAMUSCULAR; INTRAVENOUS; SUBCUTANEOUS at 13:49

## 2022-03-18 RX ADMIN — ONDANSETRON 4 MG: 2 INJECTION INTRAMUSCULAR; INTRAVENOUS at 12:15

## 2022-03-18 RX ADMIN — ACETAMINOPHEN 1000 MG: 10 INJECTION, SOLUTION INTRAVENOUS at 23:43

## 2022-03-18 RX ADMIN — ACETAMINOPHEN 1000 MG: 10 INJECTION, SOLUTION INTRAVENOUS at 18:35

## 2022-03-18 RX ADMIN — HALOPERIDOL LACTATE 1 MG: 5 INJECTION, SOLUTION INTRAMUSCULAR at 18:35

## 2022-03-18 RX ADMIN — LIDOCAINE HYDROCHLORIDE 4 ML: 40 SOLUTION TOPICAL at 12:05

## 2022-03-18 RX ADMIN — DIPHENHYDRAMINE HYDROCHLORIDE 12.5 MG: 50 INJECTION INTRAMUSCULAR; INTRAVENOUS at 18:34

## 2022-03-18 RX ADMIN — GABAPENTIN 300 MG: 300 CAPSULE ORAL at 11:22

## 2022-03-18 RX ADMIN — PROPOFOL 200 MG: 10 INJECTION, EMULSION INTRAVENOUS at 12:04

## 2022-03-18 RX ADMIN — OXYCODONE HYDROCHLORIDE 10 MG: 10 TABLET, FILM COATED, EXTENDED RELEASE ORAL at 11:21

## 2022-03-18 RX ADMIN — POTASSIUM CHLORIDE AND SODIUM CHLORIDE: 900; 150 INJECTION, SOLUTION INTRAVENOUS at 18:35

## 2022-03-18 RX ADMIN — ONDANSETRON 4 MG: 2 INJECTION INTRAMUSCULAR; INTRAVENOUS at 23:26

## 2022-03-18 RX ADMIN — ROCURONIUM BROMIDE 80 MG: 10 INJECTION, SOLUTION INTRAVENOUS at 12:04

## 2022-03-18 RX ADMIN — SCOPALAMINE 1 PATCH: 1 PATCH, EXTENDED RELEASE TRANSDERMAL at 11:21

## 2022-03-18 RX ADMIN — ACETAMINOPHEN 1 G: 10 INJECTION, SOLUTION INTRAVENOUS at 11:21

## 2022-03-18 RX ADMIN — FENTANYL CITRATE 100 MCG: 50 INJECTION, SOLUTION INTRAMUSCULAR; INTRAVENOUS at 12:29

## 2022-03-18 RX ADMIN — ONDANSETRON 4 MG: 2 INJECTION INTRAMUSCULAR; INTRAVENOUS at 13:54

## 2022-03-18 ASSESSMENT — PAIN DESCRIPTION - PAIN TYPE
TYPE: SURGICAL PAIN

## 2022-03-18 ASSESSMENT — LIFESTYLE VARIABLES
DOES PATIENT WANT TO STOP DRINKING: NO
ALCOHOL_USE: YES
TOTAL SCORE: 0
HAVE YOU EVER FELT YOU SHOULD CUT DOWN ON YOUR DRINKING: NO
EVER HAD A DRINK FIRST THING IN THE MORNING TO STEADY YOUR NERVES TO GET RID OF A HANGOVER: NO
ON A TYPICAL DAY WHEN YOU DRINK ALCOHOL HOW MANY DRINKS DO YOU HAVE: 1
TOTAL SCORE: 0
AVERAGE NUMBER OF DAYS PER WEEK YOU HAVE A DRINK CONTAINING ALCOHOL: 0
HAVE PEOPLE ANNOYED YOU BY CRITICIZING YOUR DRINKING: NO
HOW MANY TIMES IN THE PAST YEAR HAVE YOU HAD 5 OR MORE DRINKS IN A DAY: 0
CONSUMPTION TOTAL: NEGATIVE
TOTAL SCORE: 0
EVER FELT BAD OR GUILTY ABOUT YOUR DRINKING: NO

## 2022-03-18 ASSESSMENT — COGNITIVE AND FUNCTIONAL STATUS - GENERAL
SUGGESTED CMS G CODE MODIFIER MOBILITY: CH
DAILY ACTIVITIY SCORE: 24
MOBILITY SCORE: 24
SUGGESTED CMS G CODE MODIFIER DAILY ACTIVITY: CH

## 2022-03-18 ASSESSMENT — PAIN SCALES - GENERAL: PAIN_LEVEL: 5

## 2022-03-18 ASSESSMENT — FIBROSIS 4 INDEX: FIB4 SCORE: 0.17

## 2022-03-18 NOTE — ANESTHESIA POSTPROCEDURE EVALUATION
Patient: Kayy Zavala    Procedure Summary     Date: 03/18/22 Room / Location: [facility] 09 / SURGERY [facility]    Anesthesia Start: 1201 Anesthesia Stop: 1308    Procedure: GASTRECTOMY, SLEEVE, LAPAROSCOPIC (Abdomen) Diagnosis: (MORBID OBESITY)    Surgeons: Simone Bauer M.D. Responsible Provider: Elliot Herron M.D.    Anesthesia Type: general ASA Status: 3          Final Anesthesia Type: general  Last vitals  BP   Blood Pressure: 138/73    Temp   36.4 °C (97.6 °F)    Pulse   65   Resp   16    SpO2   100 %      Anesthesia Post Evaluation    Patient location during evaluation: PACU  Patient participation: complete - patient participated  Level of consciousness: awake and alert  Pain score: 5    Airway patency: patent  Anesthetic complications: no  Cardiovascular status: hemodynamically stable  Respiratory status: acceptable  Hydration status: euvolemic    PONV: none          No complications documented.     Nurse Pain Score: 3 (NPRS)

## 2022-03-18 NOTE — ANESTHESIA PROCEDURE NOTES
Airway    Date/Time: 3/18/2022 12:05 PM  Performed by: Elliot Herron M.D.  Authorized by: Elliot Herron M.D.     Location:  OR  Urgency:  Elective  Difficult Airway: No    Indications for Airway Management:  Anesthesia      Spontaneous Ventilation: absent    Sedation Level:  Deep  Preoxygenated: Yes    Patient Position:  Sniffing  Mask Difficulty Assessment:  0 - not attempted  Final Airway Type:  Endotracheal airway  Final Endotracheal Airway:  ETT  Cuffed: Yes    Technique Used for Successful ETT Placement:  Direct laryngoscopy    Insertion Site:  Oral  Blade Type:  Spencer  Laryngoscope Blade/Videolaryngoscope Blade Size:  3  ETT Size (mm):  7.5  Measured from:  Teeth  ETT to Teeth (cm):  21  Placement Verified by: auscultation and capnometry    Cormack-Lehane Classification:  Grade I - full view of glottis  Number of Attempts at Approach:  1

## 2022-03-18 NOTE — OP REPORT
NAME:  Kayy Zavala  MRN:  [id number]  :  1985      DATE OF OPERATION: 3/18/2022    PREOPERATIVE DIAGNOSIS: Morbid Obesity with medical sequelae    POSTOPERATIVE DIAGNOSIS: Morbid Obesity with medical sequelae    OPERATION PERFORMED: 1.  Laparoscopic Sleeve Gastrectomy    SURGEON: Simone Bauer MD    ASSISTANT:  Sailaja Jesus PA-C, PA-C    ANESTHESIOLOGIST:  Anesthesiologist: Elliot Herron M.D.    ANESTHESIA: General endotracheal anesthesia.     SPECIMEN: Stomach    ESTIMATED BLOOD LOSS: <10cc.     INDICATIONS: The patient is a 36 y.o. female with a diagnosis of morbid obesity with medical sequelae. She is taken to the operating room today for Laparoscopic Sleeve Gastrectomy.     PROCEDURE: Following informed consent, the patient was properly identified, taken to the operating room, and placed in the supine position where general endotracheal anesthesia was administered. Intravenous antibiotics were administered by the anesthesiologist in the correct time interval. Sequential compression devices were employed. The abdomen was prepped and draped into a sterile field.     An optical entry bladeless  trocar was utilized and pneumoperitoneum carefully established in the usual fashion.  The bladeless 5 mm separator trocar was introduced and the 5 mm lens/camera was passed into the peritoneal cavity.  Three additional separator trocars were placed under direct vision.  A 5 mm Pa-type liver retractor was placed into position.  This was used to elevate the left sided segment of the liver.  It was secured to the patients right side with a robot arm.  Careful inspection revealed no untoward events with placement of the trocars.    The gastrocolic omentum was examined and dissected with the ligasure device and a point on the distal antrum was selected to begin the sleeve gastrectomy.  A 40 Micronesian bougie was then passed down into the antrum.  A careful inspection at the hiatus demonstrated no significant  hiatal hernia which would require repair or risk significant reflux. A echelon linear stapler with a thick-tissue cartridges, was employed to divide partway across the stomach.  With the bougie in position, the stapler was then used to march proximally along the stomach and transsection of the stomach was performed, beginning 5 cm proximal to the pylorus in the method of the sleeve gastrectomy.  The greater curvature aspect of the stomach was then dissected and the greater curvature vessels and short gastric vessels were divided with the ligasure.      The last endomechanical stapler firings were used to complete the transection of the stomach.  Hemoclips were used if any site exhibited oozing. Careful inspection of the staple line demonstrated excellent, meticulous hemostasis and a completely intact staple line with seemless tissue approximation.  Seromuscular sutures were placed using polysorb suture to further secure the staple line.  The liver exhibited mild hepatic steatosis.  The bougie was then removed.     The large endocatch bag was then used to retrieve the stomach specimen.  Tisseal fibrin glue sealant was sprayed along the entire staple line. The ports were removed under direct vision and the pneumoperitoneum was allowed to escape. The fascia of this port was closed with 0-Vicryl suture.  The port sites were then irrigated well.  The port site skin incisions were closed with interrupted 4-0 Vicryl subcuticular sutures.  Steri-Strips and Benzoin were applied beneath sterile Band-Aids.     The patient tolerated the procedure well and there were no apparent complications. All sponge, needle, and instrument counts were correct on 2 separate occasions. She was awakened, extubated, and transferred to the recovery room in satisfactory condition.       ____________________________________   Simone Bauer MD  DD: 3/18/2022  12:56 PM    CC:  [facility];

## 2022-03-18 NOTE — ANESTHESIA PREPROCEDURE EVALUATION
Case: [id number] Date/Time: 03/18/22 1315    Procedure: GASTRECTOMY, SLEEVE, LAPAROSCOPIC (Abdomen)    Anesthesia type: General    Pre-op diagnosis: MORBID OBESITY    Location: [facility] 09 / SURGERY [facility]    Surgeons: Simone Bauer M.D.          Relevant Problems   No relevant active problems     /80   Pulse 74   Temp 36.2 °C (97.2 °F) (Temporal)   Resp 18   Ht 1.524 m (5')   Wt 101 kg (223 lb 5.2 oz)   LMP 03/03/2022   SpO2 94%   BMI 43.62 kg/m²       Physical Exam    Airway   Mallampati: II  TM distance: >3 FB  Neck ROM: full       Cardiovascular - normal exam  Rhythm: regular  Rate: normal  (-) murmur     Dental - normal exam           Pulmonary - normal exam  Breath sounds clear to auscultation     Abdominal    Neurological - normal exam                 Anesthesia Plan    ASA 3       Plan - general       Airway plan will be ETT          Induction: intravenous    Postoperative Plan: Postoperative administration of opioids is intended.    Pertinent diagnostic labs and testing reviewed    Informed Consent:    Anesthetic plan and risks discussed with patient.    Use of blood products discussed with: patient whom consented to blood products.

## 2022-03-18 NOTE — OR NURSING
Patient VSS. States she feels alot better.  No anxiety and pain now tolerable at 3/10.  Nausea also subsided.  ABD dressings intact with lateral left dressing with small amount of oozing.  Ice pack to sites.  Cont to monitor.  Family updated

## 2022-03-18 NOTE — OR NURSING
Patient extremely anxious.  Crying about surgical pain.  Dr Herron updated to give her Versed 1mg iv now

## 2022-03-18 NOTE — ANESTHESIA TIME REPORT
Anesthesia Start and Stop Event Times     Date Time Event    3/18/2022 1144 Ready for Procedure     1201 Anesthesia Start     1308 Anesthesia Stop        Responsible Staff  03/18/22    Name Role Begin End    Elliot Herron M.D. Anesth 1201 1308        Preop Diagnosis (Free Text):  Pre-op Diagnosis     MORBID OBESITY        Preop Diagnosis (Codes):    Premium Reason  Non-Premium    Comments:

## 2022-03-18 NOTE — OR NURSING
Patient A+Ox4.  States pain increased to 5/10 and given iv meds as ordered.  abd soft with lap stabs x4 intact lateral most incisions x2 with small amount of bloody drainage.  Fresh ice packs to sites.  Patient able to void 400ml clear urine.  MONSIVAIS well.  Plan to transfer to [facility] when ready.   updated with patient status and plan.  Patient able to speak to  to reassure him.

## 2022-03-19 LAB
ALBUMIN SERPL BCP-MCNC: 4.2 G/DL (ref 3.2–4.9)
ALBUMIN/GLOB SERPL: 1.4 G/DL
ALP SERPL-CCNC: 78 U/L (ref 30–99)
ALT SERPL-CCNC: 41 U/L (ref 2–50)
ANION GAP SERPL CALC-SCNC: 11 MMOL/L (ref 7–16)
AST SERPL-CCNC: 26 U/L (ref 12–45)
BILIRUB SERPL-MCNC: 0.6 MG/DL (ref 0.1–1.5)
BUN SERPL-MCNC: 7 MG/DL (ref 8–22)
CALCIUM SERPL-MCNC: 8.9 MG/DL (ref 8.5–10.5)
CHLORIDE SERPL-SCNC: 104 MMOL/L (ref 96–112)
CO2 SERPL-SCNC: 19 MMOL/L (ref 20–33)
CREAT SERPL-MCNC: 0.38 MG/DL (ref 0.5–1.4)
ERYTHROCYTE [DISTWIDTH] IN BLOOD BY AUTOMATED COUNT: 41.7 FL (ref 35.9–50)
GFR SERPLBLD CREATININE-BSD FMLA CKD-EPI: 134 ML/MIN/1.73 M 2
GLOBULIN SER CALC-MCNC: 3.1 G/DL (ref 1.9–3.5)
GLUCOSE SERPL-MCNC: 116 MG/DL (ref 65–99)
HCT VFR BLD AUTO: 41.5 % (ref 37–47)
HGB BLD-MCNC: 13.6 G/DL (ref 12–16)
MCH RBC QN AUTO: 29.8 PG (ref 27–33)
MCHC RBC AUTO-ENTMCNC: 32.8 G/DL (ref 33.6–35)
MCV RBC AUTO: 90.8 FL (ref 81.4–97.8)
PLATELET # BLD AUTO: 243 K/UL (ref 164–446)
PMV BLD AUTO: 10.2 FL (ref 9–12.9)
POTASSIUM SERPL-SCNC: 4.2 MMOL/L (ref 3.6–5.5)
PROT SERPL-MCNC: 7.3 G/DL (ref 6–8.2)
RBC # BLD AUTO: 4.57 M/UL (ref 4.2–5.4)
SODIUM SERPL-SCNC: 134 MMOL/L (ref 135–145)
WBC # BLD AUTO: 10.9 K/UL (ref 4.8–10.8)

## 2022-03-19 PROCEDURE — 36415 COLL VENOUS BLD VENIPUNCTURE: CPT

## 2022-03-19 PROCEDURE — 80053 COMPREHEN METABOLIC PANEL: CPT

## 2022-03-19 PROCEDURE — 85027 COMPLETE CBC AUTOMATED: CPT

## 2022-03-19 PROCEDURE — A9270 NON-COVERED ITEM OR SERVICE: HCPCS | Performed by: PHYSICIAN ASSISTANT

## 2022-03-19 PROCEDURE — 700111 HCHG RX REV CODE 636 W/ 250 OVERRIDE (IP): Performed by: PHYSICIAN ASSISTANT

## 2022-03-19 PROCEDURE — 700102 HCHG RX REV CODE 250 W/ 637 OVERRIDE(OP): Performed by: PHYSICIAN ASSISTANT

## 2022-03-19 PROCEDURE — 770001 HCHG ROOM/CARE - MED/SURG/GYN PRIV*

## 2022-03-19 PROCEDURE — 700101 HCHG RX REV CODE 250: Performed by: PHYSICIAN ASSISTANT

## 2022-03-19 RX ORDER — PROCHLORPERAZINE EDISYLATE 5 MG/ML
10 INJECTION INTRAMUSCULAR; INTRAVENOUS EVERY 6 HOURS PRN
Status: DISCONTINUED | OUTPATIENT
Start: 2022-03-19 | End: 2022-03-20 | Stop reason: HOSPADM

## 2022-03-19 RX ORDER — PROCHLORPERAZINE EDISYLATE 5 MG/ML
10 INJECTION INTRAMUSCULAR; INTRAVENOUS EVERY 6 HOURS PRN
Status: DISCONTINUED | OUTPATIENT
Start: 2022-03-19 | End: 2022-03-19

## 2022-03-19 RX ADMIN — DIPHENHYDRAMINE HYDROCHLORIDE 12.5 MG: 50 INJECTION INTRAMUSCULAR; INTRAVENOUS at 06:41

## 2022-03-19 RX ADMIN — FLUTICASONE PROPIONATE 100 MCG: 50 SPRAY, METERED NASAL at 05:31

## 2022-03-19 RX ADMIN — PROMETHAZINE HYDROCHLORIDE 25 MG: 25 SUPPOSITORY RECTAL at 14:54

## 2022-03-19 RX ADMIN — ACETAMINOPHEN 1000 MG: 500 TABLET ORAL at 08:06

## 2022-03-19 RX ADMIN — OXYCODONE HYDROCHLORIDE 5 MG: 5 SOLUTION ORAL at 03:31

## 2022-03-19 RX ADMIN — ONDANSETRON 4 MG: 2 INJECTION INTRAMUSCULAR; INTRAVENOUS at 03:27

## 2022-03-19 RX ADMIN — HALOPERIDOL LACTATE 1 MG: 5 INJECTION, SOLUTION INTRAMUSCULAR at 06:43

## 2022-03-19 RX ADMIN — ENOXAPARIN SODIUM 40 MG: 40 INJECTION SUBCUTANEOUS at 16:36

## 2022-03-19 RX ADMIN — FAMOTIDINE 20 MG: 10 INJECTION INTRAVENOUS at 16:36

## 2022-03-19 RX ADMIN — POTASSIUM CHLORIDE AND SODIUM CHLORIDE: 900; 150 INJECTION, SOLUTION INTRAVENOUS at 03:43

## 2022-03-19 RX ADMIN — GABAPENTIN 300 MG: 300 CAPSULE ORAL at 16:36

## 2022-03-19 RX ADMIN — ACETAMINOPHEN 1000 MG: 500 TABLET ORAL at 16:36

## 2022-03-19 RX ADMIN — BUDESONIDE AND FORMOTEROL FUMARATE DIHYDRATE 2 PUFF: 80; 4.5 AEROSOL RESPIRATORY (INHALATION) at 09:05

## 2022-03-19 RX ADMIN — BUDESONIDE AND FORMOTEROL FUMARATE DIHYDRATE 2 PUFF: 80; 4.5 AEROSOL RESPIRATORY (INHALATION) at 19:50

## 2022-03-19 RX ADMIN — ENOXAPARIN SODIUM 40 MG: 40 INJECTION SUBCUTANEOUS at 05:29

## 2022-03-19 RX ADMIN — POTASSIUM CHLORIDE AND SODIUM CHLORIDE: 900; 150 INJECTION, SOLUTION INTRAVENOUS at 23:02

## 2022-03-19 RX ADMIN — ONDANSETRON 4 MG: 2 INJECTION INTRAMUSCULAR; INTRAVENOUS at 08:22

## 2022-03-19 RX ADMIN — FAMOTIDINE 20 MG: 10 INJECTION INTRAVENOUS at 05:31

## 2022-03-19 RX ADMIN — ONDANSETRON 4 MG: 2 INJECTION INTRAMUSCULAR; INTRAVENOUS at 19:48

## 2022-03-19 RX ADMIN — PROCHLORPERAZINE EDISYLATE 10 MG: 5 INJECTION INTRAMUSCULAR; INTRAVENOUS at 11:13

## 2022-03-19 RX ADMIN — POTASSIUM CHLORIDE AND SODIUM CHLORIDE: 900; 150 INJECTION, SOLUTION INTRAVENOUS at 13:27

## 2022-03-19 RX ADMIN — GABAPENTIN 300 MG: 300 CAPSULE ORAL at 12:21

## 2022-03-19 ASSESSMENT — FIBROSIS 4 INDEX: FIB4 SCORE: 0.57

## 2022-03-19 ASSESSMENT — ENCOUNTER SYMPTOMS
ABDOMINAL PAIN: 1
FEVER: 0
DIZZINESS: 0
CHILLS: 0
COUGH: 0
VOMITING: 0
NAUSEA: 1

## 2022-03-19 ASSESSMENT — PAIN DESCRIPTION - PAIN TYPE: TYPE: SURGICAL PAIN

## 2022-03-19 ASSESSMENT — PATIENT HEALTH QUESTIONNAIRE - PHQ9
1. LITTLE INTEREST OR PLEASURE IN DOING THINGS: NOT AT ALL
2. FEELING DOWN, DEPRESSED, IRRITABLE, OR HOPELESS: NOT AT ALL
SUM OF ALL RESPONSES TO PHQ9 QUESTIONS 1 AND 2: 0

## 2022-03-19 NOTE — PROGRESS NOTES
4 Eyes Skin Assessment Completed by WERNER Duke and Anabell Alvarado RN.    Head WDL  Ears WDL  Nose WDL  Mouth WDL  Neck WDL  Breast/Chest WDL  Shoulder Blades Redness, psoriatic lesion dry spot mid back area  Spine WDL  (R) Arm/Elbow/Hand WDL  (L) Arm/Elbow/Hand WDL  Abdomen Incision , 4 lap sites with band aid, psoriatic lesion red spot on RLQ.   Groin WDL  Scrotum/Coccyx/Buttocks WDL  (R) Leg WDL  (L) Leg WDL  (R) Heel/Foot/Toe WDL  (L) Heel/Foot/Toe WDL          Devices In Places Nasal Cannula , glasses, PIV on L AC, SCD sleeves, pulse oximeter on L 2nd finger      Interventions In Place NC W/Ear Foams, Pillows, Heels Loaded W/Pillows and Pressure Redistribution Mattress    Possible Skin Injury No    Pictures Uploaded Into Epic N/A  Wound Consult Placed N/A  RN Wound Prevention Protocol Ordered No  Upon admission to the floor patient was informed how we do 2 RN Skin checks to make sure there are no skin issues. Pt gave this RN consent to perform a skin check.

## 2022-03-19 NOTE — CARE PLAN
The patient is Watcher - Medium risk of patient condition declining or worsening    Shift Goals  Clinical Goals: Tolerate diet, control N/V  Patient Goals: ambulate    Progress made toward(s) clinical / shift goals:       Problem: Knowledge Deficit - Standard  Goal: Patient and family/care givers will demonstrate understanding of plan of care, disease process/condition, diagnostic tests and medications  Outcome: Progressing       Patient is not progressing towards the following goals:

## 2022-03-19 NOTE — PROGRESS NOTES
Assumed care of pt after receiving report from night shift RN. Pt is A&Ox 4. Pt denies pain at this time and states improvement in nausea after given PRN nausea medication however not interested in eating breakfast at this time. Pt states she if tired due to lack of sleep from vomiting throughout the night. Pt left to rest, encouraged PO intake and educated on notifying this RN if N/V does not improve. VSS on RA. Bed is locked and in lowest position, call light within reach, fall precautions in place. All needs met at this time.

## 2022-03-19 NOTE — PROGRESS NOTES
Patient admitted from PACU. Assumed care of patient this shift. Patient is alert and oriented x 4. Able to make her needs known. Up with stand by assist in room and up to bathroom upon arrival to unit. Patient complained of nausea, medicated with PRN medications per MAR. Plan of care discussed with patient and spouse at bedside and call light is within reach.

## 2022-03-19 NOTE — PROGRESS NOTES
Surgical Progress Note    Author: Donovan Mccloud P.A.-C. Date & Time created: 3/19/2022   10:53 AM     Interval Events:  Patient is a very pleasant 35 y/o F POD1 s/p 1.  Laparoscopic Sleeve Gastrectomy.    Patient seen and examined this AM. Tolerating clears without nausea/vomiting. Admits to typical incisional abdominal pain, controlled with medication. Pt is ambulating and voiding. Labs reviewed. VSS.     Review of Systems   Constitutional: Negative for chills and fever.   Respiratory: Negative for cough.    Cardiovascular: Negative for chest pain.   Gastrointestinal: Positive for abdominal pain and nausea. Negative for vomiting.   Skin: Negative for rash.   Neurological: Negative for dizziness.     Hemodynamics:  Temp (24hrs), Av.4 °C (97.5 °F), Min:36.2 °C (97.1 °F), Max:36.8 °C (98.2 °F)  Temperature: 36.8 °C (98.2 °F)  Pulse  Av.3  Min: 65  Max: 95   Blood Pressure: 128/84     Respiratory:    Respiration: 17, Pulse Oximetry: 92 %           Neuro:  GCS       Fluids:    Intake/Output Summary (Last 24 hours) at 3/19/2022 1053  Last data filed at 3/19/2022 0529  Gross per 24 hour   Intake 800 ml   Output 115 ml   Net 685 ml     Weight: 101 kg (223 lb 5.2 oz)  Current Diet Order   Procedures   • Diet Order Diet: Clear Liquid; Miscellaneous modifications: (optional): Bariatric     Physical Exam  Constitutional:       Appearance: Normal appearance. She is obese.   HENT:      Head: Normocephalic and atraumatic.      Mouth/Throat:      Mouth: Mucous membranes are moist.   Eyes:      Extraocular Movements: Extraocular movements intact.   Cardiovascular:      Rate and Rhythm: Normal rate and regular rhythm.   Pulmonary:      Effort: Pulmonary effort is normal.   Abdominal:      Palpations: Abdomen is soft. There is no mass.      Tenderness: There is abdominal tenderness.      Hernia: No hernia is present.   Musculoskeletal:      Cervical back: Normal range of motion.   Skin:     General: Skin is warm and dry.    Neurological:      General: No focal deficit present.      Mental Status: She is alert.       Labs:  Recent Results (from the past 24 hour(s))   Histology Request    Collection Time: 03/18/22  1:12 PM   Result Value Ref Range    Pathology Request Sent to Histo    CBC without Differential (blood)    Collection Time: 03/19/22  1:55 AM   Result Value Ref Range    WBC 10.9 (H) 4.8 - 10.8 K/uL    RBC 4.57 4.20 - 5.40 M/uL    Hemoglobin 13.6 12.0 - 16.0 g/dL    Hematocrit 41.5 37.0 - 47.0 %    MCV 90.8 81.4 - 97.8 fL    MCH 29.8 27.0 - 33.0 pg    MCHC 32.8 (L) 33.6 - 35.0 g/dL    RDW 41.7 35.9 - 50.0 fL    Platelet Count 243 164 - 446 K/uL    MPV 10.2 9.0 - 12.9 fL   Comp Metabolic Panel (CMP)    Collection Time: 03/19/22  1:55 AM   Result Value Ref Range    Sodium 134 (L) 135 - 145 mmol/L    Potassium 4.2 3.6 - 5.5 mmol/L    Chloride 104 96 - 112 mmol/L    Co2 19 (L) 20 - 33 mmol/L    Anion Gap 11.0 7.0 - 16.0    Glucose 116 (H) 65 - 99 mg/dL    Bun 7 (L) 8 - 22 mg/dL    Creatinine 0.38 (L) 0.50 - 1.40 mg/dL    Calcium 8.9 8.5 - 10.5 mg/dL    AST(SGOT) 26 12 - 45 U/L    ALT(SGPT) 41 2 - 50 U/L    Alkaline Phosphatase 78 30 - 99 U/L    Total Bilirubin 0.6 0.1 - 1.5 mg/dL    Albumin 4.2 3.2 - 4.9 g/dL    Total Protein 7.3 6.0 - 8.2 g/dL    Globulin 3.1 1.9 - 3.5 g/dL    A-G Ratio 1.4 g/dL   ESTIMATED GFR    Collection Time: 03/19/22  1:55 AM   Result Value Ref Range    GFR (CKD-EPI) 134 >60 mL/min/1.73 m 2     Medical Decision Making, by Problem:  Active Hospital Problems    Diagnosis    • Morbid obesity (HCC) [E66.01]      Plan:    Pt is alert and oriented, NAD. Breathing unlabored. Tolerating PO.  Incisions ok. VS stable. Labs reviewed.  Leukocytosis, likely reactive. Encouraged ambulation and incentive spirometry.  Wean O2. Pt may need to stay another night for nausea, pain control, hypoxia, will see how the day progresses. Pt also seen and examined by Dr. Bauer.    Quality Measures:  Quality-Core Measures   Reviewed  items::  Labs reviewed  Cuello catheter::  No Cuello  DVT prophylaxis pharmacological::  Enoxaparin (Lovenox)  DVT prophylaxis - mechanical:  SCDs  Ulcer Prophylaxis::  Yes      Discussed patient condition with Patient and Dr. Bauer

## 2022-03-19 NOTE — CARE PLAN
The patient is Stable - Low risk of patient condition declining or worsening    Shift Goals  Clinical Goals: patient pain will be controlled      Progress made toward(s) clinical / shift goals:      Problem: Pain - Standard  Goal: Alleviation of pain or a reduction in pain to the patient’s comfort goal  Outcome: Progressing     Problem: Knowledge Deficit - Standard  Goal: Patient and family/care givers will demonstrate understanding of plan of care, disease process/condition, diagnostic tests and medications  Outcome: Progressing

## 2022-03-19 NOTE — PROGRESS NOTES
Pt. Received in bed awake, orientedx4. With complaints of mild to mod pain on abdominal area with PRN pain meds ordered. Pt with n/v, PRN meds given. Pt. On clear liq diet at this time, reinforced diet per order. Pt. Was able to ambulate last night. Fluids running. Pt. On O2 via NC, at this time with sats at 93% when laying down. Call light in reach. Needs attended.

## 2022-03-20 VITALS
WEIGHT: 221.12 LBS | SYSTOLIC BLOOD PRESSURE: 126 MMHG | DIASTOLIC BLOOD PRESSURE: 87 MMHG | RESPIRATION RATE: 18 BRPM | OXYGEN SATURATION: 95 % | HEART RATE: 76 BPM | HEIGHT: 60 IN | TEMPERATURE: 99.3 F | BODY MASS INDEX: 43.41 KG/M2

## 2022-03-20 LAB
ALBUMIN SERPL BCP-MCNC: 4.1 G/DL (ref 3.2–4.9)
ALBUMIN/GLOB SERPL: 1.3 G/DL
ALP SERPL-CCNC: 68 U/L (ref 30–99)
ALT SERPL-CCNC: 32 U/L (ref 2–50)
ANION GAP SERPL CALC-SCNC: 10 MMOL/L (ref 7–16)
AST SERPL-CCNC: 18 U/L (ref 12–45)
BILIRUB SERPL-MCNC: 0.6 MG/DL (ref 0.1–1.5)
BUN SERPL-MCNC: 7 MG/DL (ref 8–22)
CALCIUM SERPL-MCNC: 8.7 MG/DL (ref 8.5–10.5)
CHLORIDE SERPL-SCNC: 106 MMOL/L (ref 96–112)
CO2 SERPL-SCNC: 19 MMOL/L (ref 20–33)
CREAT SERPL-MCNC: 0.44 MG/DL (ref 0.5–1.4)
ERYTHROCYTE [DISTWIDTH] IN BLOOD BY AUTOMATED COUNT: 42.1 FL (ref 35.9–50)
GFR SERPLBLD CREATININE-BSD FMLA CKD-EPI: 130 ML/MIN/1.73 M 2
GLOBULIN SER CALC-MCNC: 3.1 G/DL (ref 1.9–3.5)
GLUCOSE SERPL-MCNC: 97 MG/DL (ref 65–99)
HCT VFR BLD AUTO: 40.7 % (ref 37–47)
HGB BLD-MCNC: 13.1 G/DL (ref 12–16)
MCH RBC QN AUTO: 29.2 PG (ref 27–33)
MCHC RBC AUTO-ENTMCNC: 32.2 G/DL (ref 33.6–35)
MCV RBC AUTO: 90.6 FL (ref 81.4–97.8)
PLATELET # BLD AUTO: 237 K/UL (ref 164–446)
PMV BLD AUTO: 10.2 FL (ref 9–12.9)
POTASSIUM SERPL-SCNC: 4 MMOL/L (ref 3.6–5.5)
PROT SERPL-MCNC: 7.2 G/DL (ref 6–8.2)
RBC # BLD AUTO: 4.49 M/UL (ref 4.2–5.4)
SODIUM SERPL-SCNC: 135 MMOL/L (ref 135–145)
WBC # BLD AUTO: 10.6 K/UL (ref 4.8–10.8)

## 2022-03-20 PROCEDURE — 36415 COLL VENOUS BLD VENIPUNCTURE: CPT

## 2022-03-20 PROCEDURE — 700111 HCHG RX REV CODE 636 W/ 250 OVERRIDE (IP): Performed by: PHYSICIAN ASSISTANT

## 2022-03-20 PROCEDURE — 700101 HCHG RX REV CODE 250: Performed by: PHYSICIAN ASSISTANT

## 2022-03-20 PROCEDURE — 85027 COMPLETE CBC AUTOMATED: CPT

## 2022-03-20 PROCEDURE — 700102 HCHG RX REV CODE 250 W/ 637 OVERRIDE(OP): Performed by: PHYSICIAN ASSISTANT

## 2022-03-20 PROCEDURE — 80053 COMPREHEN METABOLIC PANEL: CPT

## 2022-03-20 PROCEDURE — A9270 NON-COVERED ITEM OR SERVICE: HCPCS | Performed by: PHYSICIAN ASSISTANT

## 2022-03-20 RX ADMIN — POTASSIUM CHLORIDE AND SODIUM CHLORIDE: 900; 150 INJECTION, SOLUTION INTRAVENOUS at 07:45

## 2022-03-20 RX ADMIN — OXYCODONE HYDROCHLORIDE 5 MG: 5 SOLUTION ORAL at 14:48

## 2022-03-20 RX ADMIN — ONDANSETRON 4 MG: 2 INJECTION INTRAMUSCULAR; INTRAVENOUS at 06:07

## 2022-03-20 RX ADMIN — ONDANSETRON 4 MG: 2 INJECTION INTRAMUSCULAR; INTRAVENOUS at 12:00

## 2022-03-20 RX ADMIN — BUDESONIDE AND FORMOTEROL FUMARATE DIHYDRATE 2 PUFF: 80; 4.5 AEROSOL RESPIRATORY (INHALATION) at 09:09

## 2022-03-20 RX ADMIN — SIMETHICONE 125 MG: 125 TABLET, CHEWABLE ORAL at 14:48

## 2022-03-20 RX ADMIN — FLUTICASONE PROPIONATE 100 MCG: 50 SPRAY, METERED NASAL at 06:05

## 2022-03-20 RX ADMIN — ENOXAPARIN SODIUM 40 MG: 40 INJECTION SUBCUTANEOUS at 06:05

## 2022-03-20 RX ADMIN — ACETAMINOPHEN 1000 MG: 500 TABLET ORAL at 00:13

## 2022-03-20 RX ADMIN — FAMOTIDINE 20 MG: 10 INJECTION INTRAVENOUS at 06:07

## 2022-03-20 RX ADMIN — SIMETHICONE 125 MG: 125 TABLET, CHEWABLE ORAL at 09:08

## 2022-03-20 RX ADMIN — PROCHLORPERAZINE EDISYLATE 10 MG: 5 INJECTION INTRAMUSCULAR; INTRAVENOUS at 00:20

## 2022-03-20 RX ADMIN — GABAPENTIN 300 MG: 300 CAPSULE ORAL at 06:06

## 2022-03-20 ASSESSMENT — ENCOUNTER SYMPTOMS
ABDOMINAL PAIN: 1
CHILLS: 0
COUGH: 0
DIZZINESS: 0
VOMITING: 0
NAUSEA: 1
FEVER: 0

## 2022-03-20 ASSESSMENT — PAIN DESCRIPTION - PAIN TYPE
TYPE: SURGICAL PAIN
TYPE: ACUTE PAIN

## 2022-03-20 NOTE — DISCHARGE INSTRUCTIONS
Discharge Instructions    Discharged to home by car with relative. Discharged via wheelchair, hospital escort: Yes.  Special equipment needed: Not Applicable    Be sure to schedule a follow-up appointment with your primary care doctor or any specialists as instructed.     Discharge Plan:   Diet Plan: Discussed  Activity Level: Discussed  Confirmed Follow up Appointment: Patient to Call and Schedule Appointment  Confirmed Symptoms Management: Discussed  Medication Reconciliation Updated: Yes  Influenza Vaccine Indication: Not indicated: Previously immunized this influenza season and > 8 years of age    I understand that a diet low in cholesterol, fat, and sodium is recommended for good health. Unless I have been given specific instructions below for another diet, I accept this instruction as my diet prescription.   Other diet:     Special Instructions: None    · Is patient discharged on Warfarin / Coumadin?   No     Depression / Suicide Risk    As you are discharged from this [facility] facility, it is important to learn how to keep safe from harming yourself.    Recognize the warning signs:  · Abrupt changes in personality, positive or negative- including increase in energy   · Giving away possessions  · Change in eating patterns- significant weight changes-  positive or negative  · Change in sleeping patterns- unable to sleep or sleeping all the time   · Unwillingness or inability to communicate  · Depression  · Unusual sadness, discouragement and loneliness  · Talk of wanting to die  · Neglect of personal appearance   · Rebelliousness- reckless behavior  · Withdrawal from people/activities they love  · Confusion- inability to concentrate     If you or a loved one observes any of these behaviors or has concerns about self-harm, here's what you can do:  · Talk about it- your feelings and reasons for harming yourself  · Remove any means that you might use to hurt yourself (examples: pills, rope, extension cords,  firearm)  · Get professional help from the community (Mental Health, Substance Abuse, psychological counseling)  · Do not be alone:Call your Safe Contact- someone whom you trust who will be there for you.  · Call your local CRISIS HOTLINE 540-4529 or 010-792-5826  · Call your local Children's Mobile Crisis Response Team [place] (504) 589-1085 or www.Endymed  · Call the toll free National Suicide Prevention Hotlines   · National Suicide Prevention Lifeline 579-403-FUFO (9245)  · HealOr Line Network 800-SUICIDE (732-0833)      Bariatric Surgery Information  Bariatric surgery, also called weight loss surgery, is a procedure that helps you lose weight. You may consider, or your health care provider may suggest, bariatric surgery if:  · You are severely obese and have been unable to lose weight through diet and exercise.  · You have health problems related to obesity, such as:  ? Type 2 diabetes.  ? Heart disease.  ? Lung disease.  How does bariatric surgery help me lose weight?  Bariatric surgery helps you lose weight by:  · Decreasing how much food your body absorbs. This is done by closing off part of your stomach to make it smaller. This restricts the amount of food your stomach can hold.  · Changing your body's regular digestive process so that food bypasses the parts of your body that absorb calories and nutrients.  If you decide to have bariatric surgery, it is important to continue to eat a healthy diet and exercise regularly after the surgery.  What are the different kinds of bariatric surgery?  There are two kinds of bariatric surgeries:  · Restrictive surgery. This procedure makes your stomach smaller. It does not change your digestive process. The smaller the size of your new stomach, the less food you can eat. There are different types of restrictive surgeries.  · Malabsorptive surgery. This procedure makes your stomach smaller and alters your digestive process so that your body  processes less calories and nutrients. These are the most common kind of bariatric surgery. There are different types of malabsorptive surgeries.  What are the different types of restrictive surgery?  Adjustable Gastric Banding  In this procedure, an inflatable band is placed around your stomach near the upper end. This makes the passageway for food into the rest of your stomach much smaller. The band can be adjusted, making it tighter or looser, by filling it with salt solution. Your surgeon can adjust the band based on how you are feeling and how much weight you are losing. The band can be removed in the future. This requires another surgery.  Sleeve Gastrectomy  In this procedure, your stomach is made smaller. This is done by surgically removing a large part of your stomach. When your stomach is smaller, you feel full more quickly and reduce how much you eat.  What are the different types of malabsorptive surgery?    Lela-en-Y Gastric Bypass (RGB)  This is the most common weight loss surgery. In this procedure, a small stomach pouch (gastric pouch) is created in the upper part of your stomach. Next, this gastric pouch is attached directly to the middle part of your small intestine. The farther down your small intestine the new connection is made, the fewer calories and nutrients you will absorb. This surgery has the highest rate of complications.  Biliopancreatic Diversion with Duodenal Switch (BPD/DS)  This is a multi-step procedure. First, a large part of your stomach is removed, making your stomach smaller. Next, this smaller stomach is attached to the lower part of your small intestine. Like the RGB surgery, you absorb fewer calories and nutrients the farther down your small intestine the attachment is made.  What are the risks of bariatric surgery?  As with any surgical procedure, each type of bariatric surgery has its own risks. These risks also depend on your age, your overall health, and any other medical  conditions you may have. When deciding on bariatric surgery, it is very important to:  · Talk to your health care provider and choose the surgery that is best for you.  · Ask your health care provider about specific risks for the surgery you choose.  Generally, the risks of bariatric surgery include:  · Infection.  · Bleeding.  · Not getting enough nutrients from food (nutritional deficiencies).  · Failure of the device or procedure. This may require another surgery to correct the problem.  Where to find more information  · American Society for Metabolic & Bariatric Surgery: www.asmbs.org  · Weight-control Information Network (WIN): win.niddk.nih.gov  Summary  · Bariatric surgery, also called weight loss surgery, is a procedure that helps you lose weight.  · This surgery may be recommended if you have diabetes, heart disease, or lung disease.  · Generally, risks of bariatric surgery include infection, bleeding, and failure of the surgery or device, which may require another surgery to correct the problem.  This information is not intended to replace advice given to you by your health care provider. Make sure you discuss any questions you have with your health care provider.  Document Released: 12/18/2006 Document Revised: 04/07/2020 Document Reviewed: 01/22/2018  [employer] Patient Education © 2020 [employer]        Eating Plan After Bariatric Surgery, Stage 1  A diet after weight loss surgery (bariatric surgery) should provide plenty of fluids and nutrients while promoting weight loss and healing. Each stage of recovery has a different set of food and drink recommendations. Your health care provider may recommend that you work with a diet and nutrition specialist (dietitian) to make a staged eating plan that is right for you.  Stage 1 begins right after surgery and lasts until about 2 weeks after surgery, or as long as directed by your health care provider. During this time, you will eat a liquid-only diet. You  will be on clear liquids immediately after surgery. After your health care provider approves, you will move on to full liquids.  What are tips for following this plan?    Meal planning  · Eat at set times. Allow 30-45 minutes for each meal.  · Have protein with every meal. Eat protein foods first. Protein is a very important nutrient after surgery.  · You will need at least 60-80 g of protein daily, or as much as determined by your dietitian. Work with your dietitian to choose a liquid protein supplement that has:  ? At least 15 g of protein per 8 oz serving.  ? Less than 20 g total carbohydrate per 8 oz serving.  ? Less than 5 g fat per 8 oz serving.  · To get more protein, you may add 1 Tbsp non-fat dry milk powder to each ¼ cup of skim milk.  · Do not eat or drink:  ? Carbonated beverages.  ? Sweets with more than 25 g of sugar per serving.  ? High-fat foods. This includes foods with more than 5 g of fat per serving.  ? Alcohol.  ? Any foods you do not tolerate well, such as dairy or high-fiber foods.  · Move on to a soft-food diet when your health care provider approves. For most people, this happens after 2 weeks on a liquid-only diet.  General instructions  · Sip liquids. Do not use a straw until several weeks after surgery.  · Do not drink extra liquids with meals for 30 minutes before or after meals.  · Slowly sip 8-10 oz of clear liquid, preferably water, between each meal. Try to get at least 48-60 oz of fluid each day.  · Take a liquid or chewable multivitamin with iron each day. Discuss additional supplements with your health care provider or dietitian.  · Stop eating when you feel full.  · Your surgeon or dietitian may have specific eating guidelines for you. This may depend on:  ? The type of weight loss surgery you had.  ? Your overall health.  Clear liquids  It is important to drink clear liquids to make sure you stay hydrated. Try to drink at least 24-30 oz (about 3 cups) of clear liquids each day.  "Clear liquids have:  · No carbonation.  · No sugar or calories.  · No caffeine or alcohol.  Clear liquids include:  · Water and flavored water.  · Decaffeinated coffee or tea.  · \"Light\" powdered juice mixes, like lemonade.  · Clear broth.  · \"Light\" flavored gelatin.  · Sugar-free popsicles.  Full liquids  Full liquids are important for making sure you have enough calories, protein, and other nutrients. Try to get at least 24-30 oz (about 3 cups) of full liquids each day. Full liquids include:  · Low-fat or fat-free milk.  · Soy milk.  · Yogurt.  · Protein shakes or protein powder.  · \"Light\" meal replacement shakes.  · Sugar-free pudding.  Summary  · Stage 1 begins right after surgery and lasts until about 2 weeks after surgery, or as directed by your health care provider.  · Stage 1 diet is a liquid-only diet. You will be on clear liquids immediately after surgery. After your health care provider approves, you will move to full liquids.  · Eating enough protein and drinking plenty of fluids are important in promoting weight loss and healing after surgery.  This information is not intended to replace advice given to you by your health care provider. Make sure you discuss any questions you have with your health care provider.  Document Released: 06/23/2004 Document Revised: 04/09/2020 Document Reviewed: 03/19/2018  Elsevier Patient Education © 2020 [employer]        Eating Plan After Bariatric Surgery, Stage 2  A diet after weight loss surgery (bariatric surgery) should provide plenty of fluids and nutrients while promoting weight loss and healing. Each stage of recovery has a different set of food and drink recommendations. Your health care provider may recommend that you work with a diet and nutrition specialist (dietitian) to make a staged eating plan that is right for you.  Stage 2 starts about 2 weeks after surgery and lasts until about 4-5 weeks after surgery. During this stage, you begin to eat solid " foods. For the first week of your Stage 2 diet, you will eat soft, protein-rich foods. After the first one week of this stage, you will gradually add other soft foods, such as fruits and vegetables.  What are tips for following this plan?  Cooking  · Prepare foods with gravy, broth, or mayonnaise to moisten and soften the texture.  · Cook foods until soft and tender. Use cooking methods such as boiling or braising.  · Puree foods, as needed.  Meal planning  · Eat 3 meals and 2 snacks, or 5 small meals each day.   · Eat at set times. Allow 30-45 minutes for each meal.  · Limit food intake to ¾-1 cup total per meal.  · Include a soft protein food at every meal and snack, and eat the protein food first. Eat 60-80 g of protein a day when possible.  · Do not eat or drink:  ? Carbonated beverages.  ? Sweets with more than 25 g of sugar per serving.  ? High-fat foods. This includes foods with more than 5 g of fat per serving.  ? Alcohol.  ? Any foods you do not tolerate well, such as dairy or high-fiber foods.  General instructions  · Eat soft foods. This includes foods that are:  ? Fork-mashed or pureed.  ? Finely minced or ground.  ? Moist.  ? Easy to chew and swallow.  · After 1 week of soft protein foods, you may start to introduce other soft foods. Introduce soft foods as told by your dietitian and as you feel comfortable.  · Avoid starchy foods, such as bread, crackers, pasta, or cereal.  · Do not eat more than you can tolerate. Stop eating when you feel full.  · Continue to drink plenty of fluids. Sip clear liquids between meals. Full liquids may be eaten in place of a meal or snack. Try to get at least 48-60 oz of fluid each day.  · Do not drink extra liquids with meals or 30 minutes before or after meals.  · Meet with a dietitian during this stage to plan for adding other solid foods back into your diet.  · Take vitamin and mineral supplements as directed by your health care provider.  Soft protein foods  Soft  protein foods include:  · Well-cooked beans and lentils. Hummus.  · Tofu, tempeh, and bean or veggie burgers.  · Low-fat or nonfat cheese, cottage cheese, yogurt, and milk.  · Eggs or egg substitutes. Fish.  · Tender, soft-cooked lean meat and poultry. Ground meats.  Other soft foods  Other soft foods that you may eat during stage 2 include:  · Soft fruit, including soft canned fruit in light syrup or natural juice, apple sauce, bananas, melons, peaches, pears, and strawberries.  · Well-cooked vegetables.   · Hot wheat cereal. Unflavored oatmeal.  · Baby food or toddler fruits and vegetables.  · Blended fruit smoothies.  Summary  · Stage 2 diet after bariatric surgery lasts from about 2 weeks until 4-5 weeks after surgery, or as long as directed by your health care provider.  · Stage 2 diet involves eating soft solid foods. For the first week of this diet, you will eat soft, protein-rich foods. After that, you will gradually eat other soft foods as well, such as fruits and vegetables.  · Eating enough protein and drinking plenty of fluids are important for promoting weight loss and healing after surgery.  This information is not intended to replace advice given to you by your health care provider. Make sure you discuss any questions you have with your health care provider.  Document Released: 04/23/2018 Document Revised: 04/09/2020 Document Reviewed: 04/23/2018  [employer] Patient Education © 2020 [employer]        Eating Plan After Bariatric Surgery, Stage 3  A diet after weight loss surgery (bariatric surgery) should provide plenty of fluids and nutrients while promoting weight loss and healing. Each stage of recovery has a different set of food and drink recommendations. Your health care provider may recommend that you work with a diet and nutrition specialist (dietitian) to make a staged eating plan that is right for you.  You may start to transition to a stage 3 diet about 5-6 weeks after surgery. During this  stage, you will be allowed to eat foods of various textures. Continue to follow the guidelines below until your health care provider or dietitian approves eating a regular weight-maintenance diet.  What are tips for following this plan?  Cooking  · Use low-fat cooking methods, such as baking, broiling, boiling, or grilling.  · Cook using healthy fats, such as olive, sunflower, grapeseed, or canola oil.  · Cook and bake using no-calorie sweeteners.  · Avoid adding extra salt, sugar, or fat to foods when cooking.  Meal planning  · Eat 3 meals and 2 snacks, or 5 small meals each day.  · Eat at set times. Allow 30-45 minutes for each meal.  · Do not skip meals or go a long time without eating (fast). If you are having a hard time eating, talk to your health care provider or dietitian.  · Limit food intake to ¾-1 cup per meal. As you heal and advance, you may be able to eat a little more with each meal. Always listen to your body.  · Eat foods from all food groups. This includes fruits and vegetables, grains, dairy, and meat and other proteins.  · Limit carbohydrate intake to no more than 30 g per meal or 130 g per day. There are about 15-20 g of carbohydrates in 1 piece of bread or a medium piece of fruit. Half of your total grains should be whole grains.  · Include a protein-rich food at every meal and snack, and eat the protein food first. Eat 60-80 g of protein a day when possible.  General instructions  · Work with your dietitian to slowly add new foods to your diet.  · Stay hydrated. Drink at least 48-64 oz of noncarbonated, zero-calorie fluid per day. Water is the best choice.  · Limit alcohol intake as directed by your health care provider.  · Avoid foods that are high in fat or have added sugar.  · Take any vitamin supplements as directed by your health care provider.  · Ask your dietitian to help you with meal planning and strategies to continue to lose weight and maintain weight loss.  Recommended  foods    Grains  · Whole wheat bread, crackers, and pasta.  · Rice.  · Unsweetened hot and cold cereals.  Vegetables  · All fresh, frozen, or canned vegetables.  Fruits  · All fresh, frozen, or canned fruit.  Meats and other protein foods  · Lean meat, poultry, and fish.  · Eggs and egg substitutes.  · Beans and lentils. Smooth nut butters.  · Soy products, such as tempeh and tofu.  Dairy  · Low or nonfat milk, cheese, and yogurt.  · Sugar-free pudding.  · Cottage cheese.  Beverages  · Decaffeinated coffee and tea.  · Sugar-free, caffeine-free soft drinks.  Fats and oils  · Avocado.  · Olive, sunflower, grapeseed, or canola oil.  Sweets and desserts  · Low-fat, sugar-free desserts.  Seasoning and other foods  · Low-fat, low-sodium condiments.  The items listed above may not be a complete list of foods and beverages [you/your child] can eat. Contact a dietitian for more information.  Foods to avoid  · Hard-to-digest foods, including:  ? Popcorn.  ? Nuts and seeds.  ? Celery.  ? White parts of citrus fruits (pith).  · Caffeinated drinks, including:  ? Coffee and tea.  ? Energy drinks.  · Sweets and desserts with more than 25 g of sugar per serving.  The items listed above may not be a complete list of foods and beverages [you/your child] should avoid. Contact a dietitian for more information.  Summary  · Stage 3 diet starts about 5-6 weeks after surgery. Continue to follow stage 3 guidelines until your health care provider or dietitian approves eating a regular weight-maintenance diet.  · Stage 3 diet involves eating a balanced, healthy diet with foods of various textures. This diet helps you continue to lose weight and maintain weight loss.  · Eating enough protein and drinking plenty of fluids is important for promoting weight loss and healing after surgery.  This information is not intended to replace advice given to you by your health care provider. Make sure you discuss any questions you have with your health  care provider.  Document Released: 04/23/2018 Document Revised: 04/09/2020 Document Reviewed: 04/23/2018  Elsevier Patient Education © 2020 Elsevier Inc.

## 2022-03-20 NOTE — PROGRESS NOTES
Surgical Progress Note    Author: Donovan Mccloud P.A.-C. Date & Time created: 3/20/2022   9:07 AM     Interval Events:  Patient is a very pleasant 37 y/o F POD2 s/p 1.  Laparoscopic Sleeve Gastrectomy.     Per RN notes, had emesis x 2 last night. Patient seen and examined this AM. Reports feeling much better today than yesterday. Has not had any nausea/vomiting this AM. Tolerating clears well. Admits to typical incisional abdominal pain, controlled with medication. Pt is ambulating and voiding. Labs reviewed. VSS. Patient is interested in discharge home today.     Review of Systems   Constitutional: Negative for chills and fever.   Respiratory: Negative for cough.    Cardiovascular: Negative for chest pain.   Gastrointestinal: Positive for abdominal pain and nausea. Negative for vomiting.   Skin: Negative for rash.   Neurological: Negative for dizziness.     Hemodynamics:  Temp (24hrs), Av.9 °C (98.5 °F), Min:36.4 °C (97.5 °F), Max:37.7 °C (99.9 °F)  Temperature: 36.4 °C (97.5 °F)  Pulse  Av.3  Min: 65  Max: 95   Blood Pressure: 151/85     Respiratory:    Respiration: 18, Pulse Oximetry: 97 %           Neuro:  GCS       Fluids:    Intake/Output Summary (Last 24 hours) at 3/20/2022 0907  Last data filed at 3/19/2022 1427  Gross per 24 hour   Intake 358 ml   Output 25 ml   Net 333 ml     Weight: 100 kg (221 lb 1.9 oz)  Current Diet Order   Procedures   • Diet Order Diet: Clear Liquid; Miscellaneous modifications: (optional): Bariatric     Physical Exam  Constitutional:       Appearance: Normal appearance. She is obese.   HENT:      Head: Normocephalic and atraumatic.      Mouth/Throat:      Mouth: Mucous membranes are moist.   Eyes:      Extraocular Movements: Extraocular movements intact.   Cardiovascular:      Rate and Rhythm: Normal rate and regular rhythm.   Pulmonary:      Effort: Pulmonary effort is normal.   Abdominal:      Palpations: Abdomen is soft. There is no mass.      Tenderness: There is  abdominal tenderness.      Hernia: No hernia is present.   Musculoskeletal:      Cervical back: Normal range of motion.   Skin:     General: Skin is warm and dry.   Neurological:      General: No focal deficit present.      Mental Status: She is alert.       Labs:  Recent Results (from the past 24 hour(s))   CBC without Differential (blood)    Collection Time: 03/20/22  2:39 AM   Result Value Ref Range    WBC 10.6 4.8 - 10.8 K/uL    RBC 4.49 4.20 - 5.40 M/uL    Hemoglobin 13.1 12.0 - 16.0 g/dL    Hematocrit 40.7 37.0 - 47.0 %    MCV 90.6 81.4 - 97.8 fL    MCH 29.2 27.0 - 33.0 pg    MCHC 32.2 (L) 33.6 - 35.0 g/dL    RDW 42.1 35.9 - 50.0 fL    Platelet Count 237 164 - 446 K/uL    MPV 10.2 9.0 - 12.9 fL   Comp Metabolic Panel (CMP)    Collection Time: 03/20/22  2:39 AM   Result Value Ref Range    Sodium 135 135 - 145 mmol/L    Potassium 4.0 3.6 - 5.5 mmol/L    Chloride 106 96 - 112 mmol/L    Co2 19 (L) 20 - 33 mmol/L    Anion Gap 10.0 7.0 - 16.0    Glucose 97 65 - 99 mg/dL    Bun 7 (L) 8 - 22 mg/dL    Creatinine 0.44 (L) 0.50 - 1.40 mg/dL    Calcium 8.7 8.5 - 10.5 mg/dL    AST(SGOT) 18 12 - 45 U/L    ALT(SGPT) 32 2 - 50 U/L    Alkaline Phosphatase 68 30 - 99 U/L    Total Bilirubin 0.6 0.1 - 1.5 mg/dL    Albumin 4.1 3.2 - 4.9 g/dL    Total Protein 7.2 6.0 - 8.2 g/dL    Globulin 3.1 1.9 - 3.5 g/dL    A-G Ratio 1.3 g/dL   ESTIMATED GFR    Collection Time: 03/20/22  2:39 AM   Result Value Ref Range    GFR (CKD-EPI) 130 >60 mL/min/1.73 m 2     Medical Decision Making, by Problem:  Active Hospital Problems    Diagnosis    • Morbid obesity (HCC) [E66.01]      Plan:    Pt is alert and oriented, NAD. Breathing unlabored. Tolerating PO.  Incisions ok. VS stable. Labs reviewed. Encouraged ambulation and incentive spirometry. Pt may need to stay another night for nausea, pain control, hypoxia, will see how the day progresses. Otherwise okay for discharge later today. Pt also seen and examined by Dr. Bauer.    Quality  Measures:  Quality-Core Measures    Discussed patient condition with Patient and Dr. Bauer

## 2022-03-20 NOTE — DISCHARGE PLANNING
Discussed D/C and follow-up instructions with patient, she is knowledgeable about further plans due to heavy pre-op teaching. IV taken out, D/C packet signed, belongings collected, patient and significant other ambulated to private vehicle.    Left unit at 1638.

## 2022-03-20 NOTE — CARE PLAN
The patient is Stable - Low risk of patient condition declining or worsening    Shift Goals  Clinical Goals: Tolerate diet  Patient Goals: Advance diet    Progress made toward(s) clinical / shift goals:  Manage pain, tolerate advancing diet, encourage ambulation    Patient is not progressing towards the following goals: N/A

## 2022-03-20 NOTE — CARE PLAN
The patient is Stable - Low risk of patient condition declining or worsening    Shift Goals  Clinical Goals: Tolerate diet, control N/V  Patient Goals: ambulate    Progress made toward(s) clinical / shift goals:  yes    Patient is not progressing towards the following goals:

## 2022-03-20 NOTE — PROGRESS NOTES
Pt continued to vomit throughout the night and was given nausea medications to ease the N/V. Pt is up ad kiana in room and is A+O x4

## 2022-03-22 LAB — VIT B6 SERPL-MCNC: 53.8 NMOL/L (ref 20–125)

## 2022-03-23 LAB — VIT B1 BLD-MCNC: 137 NMOL/L (ref 70–180)

## 2022-03-23 NOTE — DISCHARGE SUMMARY
Discharge Summary    CHIEF COMPLAINT ON ADMISSION  No chief complaint on file.      Reason for Admission  MORBID OBESITY     Admission Date  3/18/2022    CODE STATUS  Prior    HPI & HOSPITAL COURSE  This is a 36 y.o. female here for elective lap sleeve gastrectomy. Patient did well immediately post surgery without complications. Initial discharge delayed due to post up pain and nausea. On POD2, patient was reporting feeling better and requested discharge home. On day of discharge, VSS, labs reviewed. Will follow up with our office in 1-2 weeks. Discharged.     No notes on file    Therefore, she is discharged in good and stable condition to home with close outpatient follow-up.    The patient met 2-midnight criteria for an inpatient stay at the time of discharge.    Discharge Date  3/20/2022    FOLLOW UP ITEMS POST DISCHARGE  With our office.     DISCHARGE DIAGNOSES  Principal Problem:    Morbid obesity (HCC) POA: Yes  Resolved Problems:    * No resolved hospital problems. *      FOLLOW UP  No future appointments.  Simone Bauer M.D.  [address] [facility]  [address]  [place] [address]  [phone]    Schedule an appointment as soon as possible for a visit in 1 week      Louise Santana P.A.-C.  [address]  [facility] [address]  [phone]            MEDICATIONS ON DISCHARGE     Medication List      CONTINUE taking these medications      Instructions   albuterol 108 (90 Base) MCG/ACT Aers inhalation aerosol   Inhale 2 Puffs by mouth every 6 hours as needed for Shortness of Breath.  Dose: 2 Puff     betamethasone dipropionate 0.05 % Oint  Commonly known as: DIPROLENE   Apply 1 Application topically every evening.  Dose: 1 Application     CLARITIN PO   Take 1 Tablet by mouth every day.  Dose: 1 Tablet     clobetasol 0.05 % external solution  Commonly known as: TEMOVATE   Apply  topically as needed (Uses on scalp - prn).     Enbrel SureClick 50 MG/ML Soaj  Generic drug: Etanercept   Inject 50 mg under the skin  every 7 days. Mondays  Indications: Plaque Psoriasis, Psoriasis associated with Arthritis  Dose: 50 mg     fluticasone-salmeterol 100-50 MCG/DOSE Aepb  Commonly known as: Advair   Inhale 1 Puff every day.  Dose: 1 Puff     Jublia 10 % Soln  Generic drug: Efinaconazole   Apply 1 Application topically every day.  Dose: 1 Application     mometasone 50 MCG/ACT nasal spray  Commonly known as: NASONEX   Spray 2 Sprays in nose every day. Each nostril  Dose: 2 Spray            Allergies  Allergies   Allergen Reactions   • Lamisil [Terbinafine Hcl]      hives       DIET  No orders of the defined types were placed in this encounter.      ACTIVITY  As tolerated.  Weight bearing as tolerated    CONSULTATIONS  none    PROCEDURES  See op report    LABORATORY  Lab Results   Component Value Date    SODIUM 135 03/20/2022    POTASSIUM 4.0 03/20/2022    CHLORIDE 106 03/20/2022    CO2 19 (L) 03/20/2022    GLUCOSE 97 03/20/2022    BUN 7 (L) 03/20/2022    CREATININE 0.44 (L) 03/20/2022    CREATININE 0.8 04/16/2009        Lab Results   Component Value Date    WBC 10.6 03/20/2022    HEMOGLOBIN 13.1 03/20/2022    HEMATOCRIT 40.7 03/20/2022    PLATELETCT 237 03/20/2022        Total time of the discharge process exceeds 30 minutes.

## 2022-03-24 ENCOUNTER — HOSPITAL ENCOUNTER (EMERGENCY)
Facility: MEDICAL CENTER | Age: 37
End: 2022-03-25
Attending: EMERGENCY MEDICINE
Payer: MEDICAID

## 2022-03-24 DIAGNOSIS — R21 RASH: Primary | ICD-10-CM

## 2022-03-24 PROCEDURE — 36415 COLL VENOUS BLD VENIPUNCTURE: CPT

## 2022-03-24 PROCEDURE — 99283 EMERGENCY DEPT VISIT LOW MDM: CPT

## 2022-03-24 ASSESSMENT — FIBROSIS 4 INDEX: FIB4 SCORE: 0.46

## 2022-03-25 VITALS
HEIGHT: 60 IN | SYSTOLIC BLOOD PRESSURE: 112 MMHG | DIASTOLIC BLOOD PRESSURE: 70 MMHG | RESPIRATION RATE: 16 BRPM | TEMPERATURE: 98 F | BODY MASS INDEX: 42.07 KG/M2 | HEART RATE: 72 BPM | OXYGEN SATURATION: 95 % | WEIGHT: 214.29 LBS

## 2022-03-25 LAB
ALBUMIN SERPL BCP-MCNC: 4.4 G/DL (ref 3.2–4.9)
ALBUMIN/GLOB SERPL: 1.3 G/DL
ALP SERPL-CCNC: 79 U/L (ref 30–99)
ALT SERPL-CCNC: 33 U/L (ref 2–50)
ANION GAP SERPL CALC-SCNC: 14 MMOL/L (ref 7–16)
AST SERPL-CCNC: 20 U/L (ref 12–45)
BASOPHILS # BLD AUTO: 0.3 % (ref 0–1.8)
BASOPHILS # BLD: 0.03 K/UL (ref 0–0.12)
BILIRUB SERPL-MCNC: 0.7 MG/DL (ref 0.1–1.5)
BUN SERPL-MCNC: 16 MG/DL (ref 8–22)
CALCIUM SERPL-MCNC: 9.5 MG/DL (ref 8.5–10.5)
CHLORIDE SERPL-SCNC: 103 MMOL/L (ref 96–112)
CO2 SERPL-SCNC: 22 MMOL/L (ref 20–33)
CREAT SERPL-MCNC: 0.46 MG/DL (ref 0.5–1.4)
EOSINOPHIL # BLD AUTO: 0.61 K/UL (ref 0–0.51)
EOSINOPHIL NFR BLD: 6.2 % (ref 0–6.9)
ERYTHROCYTE [DISTWIDTH] IN BLOOD BY AUTOMATED COUNT: 42.7 FL (ref 35.9–50)
GFR SERPLBLD CREATININE-BSD FMLA CKD-EPI: 128 ML/MIN/1.73 M 2
GLOBULIN SER CALC-MCNC: 3.4 G/DL (ref 1.9–3.5)
GLUCOSE SERPL-MCNC: 83 MG/DL (ref 65–99)
HCT VFR BLD AUTO: 46.1 % (ref 37–47)
HGB BLD-MCNC: 15.3 G/DL (ref 12–16)
IMM GRANULOCYTES # BLD AUTO: 0.03 K/UL (ref 0–0.11)
IMM GRANULOCYTES NFR BLD AUTO: 0.3 % (ref 0–0.9)
LYMPHOCYTES # BLD AUTO: 3.27 K/UL (ref 1–4.8)
LYMPHOCYTES NFR BLD: 33.1 % (ref 22–41)
MCH RBC QN AUTO: 29.7 PG (ref 27–33)
MCHC RBC AUTO-ENTMCNC: 33.2 G/DL (ref 33.6–35)
MCV RBC AUTO: 89.3 FL (ref 81.4–97.8)
MONOCYTES # BLD AUTO: 0.87 K/UL (ref 0–0.85)
MONOCYTES NFR BLD AUTO: 8.8 % (ref 0–13.4)
NEUTROPHILS # BLD AUTO: 5.08 K/UL (ref 2–7.15)
NEUTROPHILS NFR BLD: 51.3 % (ref 44–72)
NRBC # BLD AUTO: 0 K/UL
NRBC BLD-RTO: 0 /100 WBC
PLATELET # BLD AUTO: 272 K/UL (ref 164–446)
PMV BLD AUTO: 10.2 FL (ref 9–12.9)
POTASSIUM SERPL-SCNC: 3.7 MMOL/L (ref 3.6–5.5)
PROT SERPL-MCNC: 7.8 G/DL (ref 6–8.2)
RBC # BLD AUTO: 5.16 M/UL (ref 4.2–5.4)
SODIUM SERPL-SCNC: 139 MMOL/L (ref 135–145)
WBC # BLD AUTO: 9.9 K/UL (ref 4.8–10.8)

## 2022-03-25 PROCEDURE — A9270 NON-COVERED ITEM OR SERVICE: HCPCS | Performed by: EMERGENCY MEDICINE

## 2022-03-25 PROCEDURE — 700102 HCHG RX REV CODE 250 W/ 637 OVERRIDE(OP): Performed by: EMERGENCY MEDICINE

## 2022-03-25 PROCEDURE — 80053 COMPREHEN METABOLIC PANEL: CPT

## 2022-03-25 PROCEDURE — 85025 COMPLETE CBC W/AUTO DIFF WBC: CPT

## 2022-03-25 RX ORDER — DIPHENHYDRAMINE HCL 25 MG
50 TABLET ORAL ONCE
Status: COMPLETED | OUTPATIENT
Start: 2022-03-25 | End: 2022-03-25

## 2022-03-25 RX ORDER — DIPHENHYDRAMINE HYDROCHLORIDE 50 MG/ML
25 INJECTION INTRAMUSCULAR; INTRAVENOUS ONCE
Status: DISCONTINUED | OUTPATIENT
Start: 2022-03-25 | End: 2022-03-25

## 2022-03-25 RX ADMIN — DIPHENHYDRAMINE HYDROCHLORIDE 50 MG: 25 TABLET ORAL at 02:30

## 2022-03-25 ASSESSMENT — ENCOUNTER SYMPTOMS
NAUSEA: 0
SHORTNESS OF BREATH: 0
CARDIOVASCULAR NEGATIVE: 1
DIARRHEA: 0
CHILLS: 0
ABDOMINAL PAIN: 0
VOMITING: 0
FEVER: 0

## 2022-03-25 NOTE — DISCHARGE INSTRUCTIONS
Call your doctor in the morning. If you have any worsening symptoms, such as mouth rash/blistering, blistering on skin, bleeding, shortness of breath, chest pain please come back to the ER right away.  Your labs blood counts and metabolic panel are still pending. You can come back at any time for any reason

## 2022-03-25 NOTE — ED PROVIDER NOTES
ED Provider Note    Scribed for SOPHIA Sauceda II* by Rosa Coughlin. 3/25/2022  12:45 AM    Means of Arrival: walk-in  History obtained by: patient  Limitations: none    CHIEF COMPLAINT  Chief Complaint   Patient presents with   • Rash     Pt reports gastric sleeve surgery on Friday, pt has been injecting Lovenox and started having itching, redness and warmth to abdomen area since Friday.  Pt has scattered rash/redness to abdomen. Pt has hx of psoriasis         HPI  Kayy Zavala is a 36 y.o. female who has a history of psoriasis, presents to the Emergency Department for evaluation of an abdominal rash onset 6 days ago. On Friday, March 18 th, Kayy had a gastric sleeve preformed by Dr. Bauer. She has been injecting Lovenox injections everyday in her abdomen. She was told to take Lovenox 10 days after her surgery. She noticed that she begun developing a rash on her abdomen, under her breast the day after she left the hospital. Recently the rash spread to her back. The rash is red, itchy, and slightly painful to touch. She adds that were her IV was placed it is also irritated and itchy. She denies any allergies to any adhesives. She notes that she does have psoriasis and the rash is typically on her scalp and occasionally under her breast and abdomen. She states that this rash is different than her psoriasis. Kayy has not been taking any her psoriasis medication since her surgery. However, she was told she is allowed to take her medication. She denies any rash on her legs, fever, chills, nausea, vomiting, or diarrhea. No alleviating or exacerbating factors were reported.     REVIEW OF SYSTEMS  Review of Systems   Constitutional: Negative for chills and fever.   Respiratory: Negative for shortness of breath.    Cardiovascular: Negative.    Gastrointestinal: Negative for abdominal pain, diarrhea, nausea and vomiting.   Genitourinary: Negative.    Skin: Positive for itching and rash.   All other  systems reviewed and are negative.    See HPI for further details.    PAST MEDICAL HISTORY   has a past medical history of Asthma (2022), Fatty liver, Liver disease, Plaque psoriasis, Psoriasis, Psoriatic arthritis (HCC), Psychiatric disorder, Ruptured ovarian cyst, Sleep apnea, Snoring, and kendy.    SOCIAL HISTORY  Social History     Tobacco Use   • Smoking status: Never Smoker   • Smokeless tobacco: Never Used   • Tobacco comment: Smoked at age 20 for about a 6 months.   Vaping Use   • Vaping Use: Never used   Substance and Sexual Activity   • Alcohol use: Yes     Comment: Socially   • Drug use: No   • Sexual activity: Not reported       SURGICAL HISTORY   has a past surgical history that includes  DELIVERY SER; endoscopy; gyn surgery; and lap, wan restrict proc, longitudinal gas* (3/18/2022).    CURRENT MEDICATIONS  Home Medications     Reviewed by Shanta Wade R.N. (Registered Nurse) on 22 at   Med List Status: <None>   Medication Last Dose Status   albuterol (VENTOLIN OR PROVENTIL) 108 (90 BASE) MCG/ACT AERS  Active   betamethasone dipropionate (DIPROLENE) 0.05 % Ointment  Active   clobetasol (TEMOVATE) 0.05 % external solution  Active   ENBREL SURECLICK 50 MG/ML Solution Auto-injector  Active   fluticasone-salmeterol (ADVAIR) 100-50 MCG/DOSE AEROSOL POWDER, BREATH ACTIVATED  Active   JUBLIA 10 % Solution  Active   Loratadine (CLARITIN PO)  Active   mometasone (NASONEX) 50 MCG/ACT nasal spray  Active                ALLERGIES  Allergies   Allergen Reactions   • Lamisil [Terbinafine Hcl]      hives       PHYSICAL EXAM  VITAL SIGNS: /89   Pulse 90   Temp 36.7 °C (98 °F) (Temporal)   Resp 18   Ht 1.524 m (5')   Wt 97.2 kg (214 lb 4.6 oz)   LMP 2022   SpO2 98%   BMI 41.85 kg/m²     Pulse ox interpretation: I interpret this pulse ox as normal.  Constitutional: Alert in no apparent distress. 36 y.o. female laying in ED bed  HENT: Normocephalic, Atraumatic, Bilateral  external ears normal. Nose normal.   Neck: Supple, no stridor.   Eyes: Pupils are equal. Conjunctiva normal, non-icteric.   Heart: Regular rate and rhythm, no murmurs.    Lungs: Normal respiratory effort Clear to auscultation bilaterally.  Abdomen: Approprietly soft, nondistended, nontender.  Skin: Rash on the abdomen rasied irregular shaped with redness, it does not mi, there is no blisters, the rash has peticheal appearance under breast, surgical incisions are clean, dry, and intact. She also has a raised red rash on her upper extremities and back but it is sparse   Neurologic: Alert and oriented x4, Grossly non-focal.   MSK: No apparent deformities. Moves all extremities  Psychiatric:  Appears appropriate and not intoxicated.     DIAGNOSTIC STUDIES / PROCEDURES    LABS  Pertinent Labs & Imaging studies reviewed. (See chart for details)  Labs Reviewed   CBC WITH DIFFERENTIAL - Abnormal; Notable for the following components:       Result Value    MCHC 33.2 (*)     Monos (Absolute) 0.87 (*)     Eos (Absolute) 0.61 (*)     All other components within normal limits   COMP METABOLIC PANEL - Abnormal; Notable for the following components:    Creatinine 0.46 (*)     All other components within normal limits   ESTIMATED GFR       COURSE & MEDICAL DECISION MAKING  Pertinent Labs & Imaging studies reviewed. (See chart for details)    12:45 AM This is a 36 y.o. female who presents with a rash on her abdomen, upper extremities and back and the differential diagnosis includes but is not limited to heparin induced thrombocytopenia, allergic reaction, drug rash. Not carwley izzy syndrome. Ordered for CBC with diff and CMP to evaluate. Patient will be treated with Benadryl 25 mg injection for her itchiness and rash.     2:23 AM Patient was reevaluated at bedside. Patient is requesting to leave home at this time and would like to access her results on [facility].  Results have not yet returned.  Her plan is to follow-up with  the results on MyChart, hold next dose of Lovenox, contact Dr. Bauer's office.  I discussed with her my concerns of her leaving at this time especially if we discover that this is a presentation of heparin-induced thrombocytopenia.  I do plan to contact her by phone if there are significant lab abnormalities. She was given Benadryl prior to discharge.  I did have some concerns about starting steroids given recent surgery and held off on this.    The patient is leaving against medical advice.  I discussed with the patient the risks of leaving without receiving appropriate care to include permanent disability or death.  I have discussed possible alternatives.  The patient is not intoxicated.  The patient is a capable decision maker and understands the risks and benefits of their decision.  While I certainly disagree with the patient's decision, I respect the patient's autonomy and will not keep them here against their will.  They understand that their decision to leave can be reversed at any time and they can return to us at any time for any reason at all.    DISPOSITION:  Patient will be discharged home against medical advice.     FOLLOW UP:  Call your doctor in the morning. If you have any worsening symptoms, such as mouth rash/blistering, blistering on skin, bleeding, shortness of breath, chest pain please come back to the ER right away.  Your labs are still pending.          FINAL IMPRESSION  1. Rash Active         Rosa MARSH (Scribe), am scribing for, and in the presence of, GERARD Sauceda II.    Electronically signed by: Rosa Coughlin (Nancy), 3/25/2022    Oumar MARSH II, M* personally performed the services described in this documentation, as scribed by Rosa Coughlin in my presence, and it is both accurate and complete.    The note accurately reflects work and decisions made by me.  Oumar Sal II, M.D.  3/25/2022  8:10 AM      Addendum:  I followed up on labs and they are  within acceptable limits.  Oumar Sal II, M.D. 3/25/2022 8:11 AM

## 2022-03-25 NOTE — ED NOTES
Patient vital signs rechecked and documented per [facility]. Patient denies any new needs at this time.  Patient updated on wait times, thanked for patience. Pt informed to alert triage tech or triage RN with any needs and/or changes in condition; patient verbalized understanding.

## 2022-03-25 NOTE — ED NOTES
Patient ambulated from lobby to room independently with steady gait.  Patient placed on monitor and chart up for ERP.

## 2022-03-25 NOTE — ED TRIAGE NOTES
Chief Complaint   Patient presents with   • Rash     Pt reports gastric sleeve surgery on Friday, pt has been injecting Lovenox and started having itching, redness and warmth to abdomen area since Friday.  Pt has scattered rash/redness to abdomen. Pt has hx of psoriasis         Pt to triage with steady gait for above complaint.     Pt presents in triage with rash all over abdomen, under breasts, and sides.  Pt denies pain, reports itching, swelling, and warmth. No swelling to face or throat.    Pt back to lobby, educated on triage process and encourage to alert staff of any changes.     /87   Pulse 94   Temp 36.1 °C (97 °F) (Temporal)   Resp 14   Ht 1.524 m (5')   Wt 97.2 kg (214 lb 4.6 oz)   LMP 03/03/2022   SpO2 98%   BMI 41.85 kg/m²

## 2024-08-16 ENCOUNTER — APPOINTMENT (OUTPATIENT)
Dept: RADIOLOGY | Facility: MEDICAL CENTER | Age: 39
End: 2024-08-16
Attending: EMERGENCY MEDICINE
Payer: OTHER MISCELLANEOUS

## 2024-08-16 ENCOUNTER — HOSPITAL ENCOUNTER (EMERGENCY)
Facility: MEDICAL CENTER | Age: 39
End: 2024-08-16
Attending: EMERGENCY MEDICINE
Payer: OTHER MISCELLANEOUS

## 2024-08-16 VITALS
RESPIRATION RATE: 14 BRPM | WEIGHT: 159.39 LBS | DIASTOLIC BLOOD PRESSURE: 78 MMHG | BODY MASS INDEX: 31.29 KG/M2 | HEART RATE: 78 BPM | SYSTOLIC BLOOD PRESSURE: 116 MMHG | TEMPERATURE: 98 F | HEIGHT: 60 IN | OXYGEN SATURATION: 96 %

## 2024-08-16 DIAGNOSIS — S50.10XA: ICD-10-CM
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
## 2024-08-16 DIAGNOSIS — S16.1XXA STRAIN OF NECK MUSCLE, INITIAL ENCOUNTER: ICD-10-CM

## 2024-08-16 PROCEDURE — 73090 X-RAY EXAM OF FOREARM: CPT | Mod: RT

## 2024-08-16 PROCEDURE — 700102 HCHG RX REV CODE 250 W/ 637 OVERRIDE(OP): Performed by: EMERGENCY MEDICINE

## 2024-08-16 PROCEDURE — 70450 CT HEAD/BRAIN W/O DYE: CPT

## 2024-08-16 PROCEDURE — 99284 EMERGENCY DEPT VISIT MOD MDM: CPT

## 2024-08-16 PROCEDURE — 72125 CT NECK SPINE W/O DYE: CPT

## 2024-08-16 PROCEDURE — A9270 NON-COVERED ITEM OR SERVICE: HCPCS | Performed by: EMERGENCY MEDICINE

## 2024-08-16 PROCEDURE — 73030 X-RAY EXAM OF SHOULDER: CPT | Mod: LT

## 2024-08-16 RX ORDER — HYDROCODONE BITARTRATE AND ACETAMINOPHEN 5; 325 MG/1; MG/1
1 TABLET ORAL ONCE
Status: COMPLETED | OUTPATIENT
Start: 2024-08-16 | End: 2024-08-16

## 2024-08-16 RX ADMIN — HYDROCODONE BITARTRATE AND ACETAMINOPHEN 1 TABLET: 5; 325 TABLET ORAL at 15:16
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
## 2024-09-26 ENCOUNTER — OFFICE VISIT (OUTPATIENT)
Dept: URGENT CARE | Facility: PHYSICIAN GROUP | Age: 39
End: 2024-09-26
Payer: COMMERCIAL

## 2024-09-26 VITALS
SYSTOLIC BLOOD PRESSURE: 122 MMHG | WEIGHT: 161.2 LBS | OXYGEN SATURATION: 99 % | HEART RATE: 90 BPM | BODY MASS INDEX: 31.65 KG/M2 | DIASTOLIC BLOOD PRESSURE: 72 MMHG | TEMPERATURE: 99 F | RESPIRATION RATE: 20 BRPM | HEIGHT: 60 IN

## 2024-09-26 DIAGNOSIS — H81.10 BENIGN PAROXYSMAL POSITIONAL VERTIGO, UNSPECIFIED LATERALITY: ICD-10-CM

## 2024-09-26 PROCEDURE — 99214 OFFICE O/P EST MOD 30 MIN: CPT | Performed by: PHYSICIAN ASSISTANT

## 2024-09-26 PROCEDURE — 3074F SYST BP LT 130 MM HG: CPT | Performed by: PHYSICIAN ASSISTANT

## 2024-09-26 PROCEDURE — 3078F DIAST BP <80 MM HG: CPT | Performed by: PHYSICIAN ASSISTANT

## 2024-09-26 RX ORDER — RISANKIZUMAB-RZAA 150 MG/ML
150 INJECTION SUBCUTANEOUS
COMMUNITY
Start: 2024-08-12

## 2024-09-26 RX ORDER — AZELASTINE 1 MG/ML
1 SPRAY, METERED NASAL 2 TIMES DAILY
Qty: 30 ML | Refills: 0 | Status: SHIPPED | OUTPATIENT
Start: 2024-09-26

## 2024-09-26 RX ORDER — PREDNISONE 10 MG/1
40 TABLET ORAL DAILY
Qty: 20 TABLET | Refills: 0 | Status: SHIPPED | OUTPATIENT
Start: 2024-09-26 | End: 2024-10-01

## 2024-09-26 ASSESSMENT — ENCOUNTER SYMPTOMS
HEADACHES: 0
VOMITING: 0
DIARRHEA: 0
MYALGIAS: 0
COUGH: 0
ABDOMINAL PAIN: 0
CHILLS: 0
NAUSEA: 1
DIZZINESS: 1
FEVER: 0
EYE PAIN: 0
CONSTIPATION: 0
SORE THROAT: 0
SHORTNESS OF BREATH: 0

## 2024-09-27 NOTE — PROGRESS NOTES
Subjective:   Kayy Zavala is a 38 y.o. female who presents for Dizziness (Believes she has vertigo, dizzy, nauseous, spinning. 2nd day feeling like this.)      38-year-old female presents with acute onset dizziness starting yesterday.  She feels much improved at rest but especially with laying down or sitting up, forward and backwards motions she notes this triggers pretty extreme vertigo and spinning.  She has not had any otalgia.  No recent viral URI.  No vision changes.  No recent head trauma    Review of Systems   Constitutional:  Negative for chills and fever.   HENT:  Negative for congestion, ear pain and sore throat.    Eyes:  Negative for pain.   Respiratory:  Negative for cough and shortness of breath.    Cardiovascular:  Negative for chest pain.   Gastrointestinal:  Positive for nausea. Negative for abdominal pain, constipation, diarrhea and vomiting.   Genitourinary:  Negative for dysuria.   Musculoskeletal:  Negative for myalgias.   Skin:  Negative for rash.   Neurological:  Positive for dizziness. Negative for headaches.       Medications, Allergies, and current problem list reviewed today in Epic.     Objective:     /72 (BP Location: Right arm, Patient Position: Sitting, BP Cuff Size: Adult long)   Pulse 90   Temp 37.2 °C (99 °F) (Temporal)   Resp 20   Ht 1.524 m (5')   Wt 73.1 kg (161 lb 3.2 oz)   SpO2 99%     Physical Exam  Vitals reviewed.   Constitutional:       Appearance: Normal appearance.   HENT:      Head: Normocephalic and atraumatic.      Right Ear: External ear normal.      Left Ear: External ear normal.      Nose: Nose normal.      Mouth/Throat:      Mouth: Mucous membranes are moist.   Eyes:      Extraocular Movements: Extraocular movements intact.      Conjunctiva/sclera: Conjunctivae normal.      Pupils: Pupils are equal, round, and reactive to light.   Cardiovascular:      Rate and Rhythm: Normal rate.   Pulmonary:      Effort: Pulmonary effort is normal.   Skin:      General: Skin is warm and dry.      Capillary Refill: Capillary refill takes less than 2 seconds.   Neurological:      Mental Status: She is alert and oriented to person, place, and time.      Comments: No nystagmus, no dysarthria dysphagia or dysdiadochokinesia.  Cranial nerves II through XII grossly intact, negative Romberg, normal finger-to-nose         Assessment/Plan:     Diagnosis and associated orders:     1. Benign paroxysmal positional vertigo, unspecified laterality  azelastine (ASTELIN) 137 MCG/SPRAY nasal spray    Referral to Physical Therapy    predniSONE (DELTASONE) 10 MG Tab         Comments/MDM:     Patient with normal exam, history and physical consistent with a peripheral source of vertigo and consistent with positional vertigo.  She does have some congestion and allergy symptoms that may be contributing although no evidence of ear infection.  Will start her on some prednisone and azelastine, continue antihistamine and steroid nasal spray.  Consider close follow-up or present to the ER if any new or concerning symptoms arise         Differential diagnosis, natural history, supportive care, and indications for immediate follow-up discussed.    Advised the patient to follow-up with the primary care physician for recheck, reevaluation, and consideration of further management.    Please note that this dictation was created using voice recognition software. I have made a reasonable attempt to correct obvious errors, but I expect that there are errors of grammar and possibly content that I did not discover before finalizing the note.    This note was electronically signed by Shorty Pederson PA-C

## 2025-02-24 ENCOUNTER — OFFICE VISIT (OUTPATIENT)
Dept: URGENT CARE | Facility: CLINIC | Age: 40
End: 2025-02-24
Payer: COMMERCIAL

## 2025-02-24 ENCOUNTER — RESULTS FOLLOW-UP (OUTPATIENT)
Dept: URGENT CARE | Facility: CLINIC | Age: 40
End: 2025-02-24

## 2025-02-24 VITALS
TEMPERATURE: 98.3 F | SYSTOLIC BLOOD PRESSURE: 128 MMHG | HEIGHT: 60 IN | DIASTOLIC BLOOD PRESSURE: 70 MMHG | HEART RATE: 100 BPM | WEIGHT: 173.2 LBS | RESPIRATION RATE: 19 BRPM | BODY MASS INDEX: 34 KG/M2 | OXYGEN SATURATION: 100 %

## 2025-02-24 DIAGNOSIS — B34.9 NONSPECIFIC SYNDROME SUGGESTIVE OF VIRAL ILLNESS: ICD-10-CM

## 2025-02-24 DIAGNOSIS — J02.9 SORE THROAT: ICD-10-CM

## 2025-02-24 LAB
FLUAV RNA SPEC QL NAA+PROBE: NEGATIVE
FLUBV RNA SPEC QL NAA+PROBE: NEGATIVE
RSV RNA SPEC QL NAA+PROBE: NEGATIVE
S PYO DNA SPEC NAA+PROBE: NOT DETECTED
SARS-COV-2 RNA RESP QL NAA+PROBE: NEGATIVE

## 2025-02-24 PROCEDURE — 87637 SARSCOV2&INF A&B&RSV AMP PRB: CPT | Mod: QW | Performed by: PHYSICIAN ASSISTANT

## 2025-02-24 PROCEDURE — 87651 STREP A DNA AMP PROBE: CPT | Performed by: PHYSICIAN ASSISTANT

## 2025-02-24 PROCEDURE — 3074F SYST BP LT 130 MM HG: CPT | Performed by: PHYSICIAN ASSISTANT

## 2025-02-24 PROCEDURE — 99214 OFFICE O/P EST MOD 30 MIN: CPT | Performed by: PHYSICIAN ASSISTANT

## 2025-02-24 PROCEDURE — 3078F DIAST BP <80 MM HG: CPT | Performed by: PHYSICIAN ASSISTANT

## 2025-02-24 ASSESSMENT — ENCOUNTER SYMPTOMS
SHORTNESS OF BREATH: 0
CARDIOVASCULAR NEGATIVE: 1
TROUBLE SWALLOWING: 1
SWOLLEN GLANDS: 1
SORE THROAT: 1
ABDOMINAL PAIN: 0
FEVER: 0
COUGH: 1
DIARRHEA: 0
MYALGIAS: 1
WHEEZING: 0
STRIDOR: 0
VOMITING: 0
GASTROINTESTINAL NEGATIVE: 1
HEADACHES: 1
CHILLS: 1
HOARSE VOICE: 0

## 2025-02-24 ASSESSMENT — FIBROSIS 4 INDEX: FIB4 SCORE: 0.72

## 2025-02-25 ENCOUNTER — APPOINTMENT (OUTPATIENT)
Dept: URGENT CARE | Facility: PHYSICIAN GROUP | Age: 40
End: 2025-02-25
Payer: COMMERCIAL

## 2025-02-25 NOTE — PROGRESS NOTES
Subjective     Kayy Zavala is a very pleasant 39 y.o. female who presents with Pharyngitis (Sx started yesterday with body aches, sore throat today, feels fatigue, head aches, she has an auto-immune disorder, body chills,  mild cough, she has to take her auto-immune injection tonight so she wants to make sure she is okay )            Pharyngitis   This is a new problem. The current episode started yesterday. The problem has been gradually worsening. There has been no fever. The fever has been present for Less than 1 day. Associated symptoms include congestion, coughing, headaches, swollen glands and trouble swallowing. Pertinent negatives include no abdominal pain, diarrhea, ear pain, hoarse voice, shortness of breath, stridor or vomiting. Treatments tried: OTC cough and cold. The treatment provided mild relief.       PMH:  has a past medical history of Asthma (03/14/2022), Fatty liver, Liver disease, Plaque psoriasis, Psoriasis, Psoriatic arthritis (HCC), Psychiatric disorder, Ruptured ovarian cyst, Sleep apnea, Snoring, and kendy.    She has no past medical history of Cancer (HCC) or Hypertension.  MEDS:   Current Outpatient Medications:     SKYRIZI  MG/ML Solution Auto-injector, Inject 150 mg as directed every 12 weeks., Disp: , Rfl:     azelastine (ASTELIN) 137 MCG/SPRAY nasal spray, Administer 1 Spray into affected nostril(S) 2 times a day., Disp: 30 mL, Rfl: 0    clobetasol (TEMOVATE) 0.05 % external solution, Apply  topically as needed (Uses on scalp - prn)., Disp: , Rfl:     betamethasone dipropionate (DIPROLENE) 0.05 % Ointment, Apply 1 Application topically every evening., Disp: , Rfl:     Loratadine (CLARITIN PO), Take 1 Tablet by mouth every day., Disp: , Rfl:     albuterol (VENTOLIN OR PROVENTIL) 108 (90 BASE) MCG/ACT AERS, Inhale 2 Puffs by mouth every 6 hours as needed for Shortness of Breath., Disp: 1 Inhaler, Rfl: 3    mometasone (NASONEX) 50 MCG/ACT nasal spray, Spray 2 Sprays in nose  every day. Each nostril, Disp: 1 Inhaler, Rfl: 1  ALLERGIES:   Allergies   Allergen Reactions    Ibuprofen      Has gastric sleeve.    Lamisil [Terbinafine Hcl]      hives    Lovenox [Enoxaparin]      SURGHX:   Past Surgical History:   Procedure Laterality Date    [place] LAP CHANTE RESTRICT PROC LONGITUDINAL GAS*  3/18/2022    Procedure: GASTRECTOMY, SLEEVE, LAPAROSCOPIC;  Surgeon: Simone Bauer M.D.;  Location: SURGERY [facility];  Service: General    ENDOSCOPY      GYN SURGERY      c section    [facility]  DELIVERY SER       SOCHX:  reports that she has never smoked. She has never used smokeless tobacco. She reports current alcohol use. She reports that she does not use drugs.  FH: family history is not on file.      Review of Systems   Constitutional:  Positive for chills. Negative for fever.   HENT:  Positive for congestion, sore throat and trouble swallowing. Negative for ear pain and hoarse voice.    Respiratory:  Positive for cough. Negative for shortness of breath, wheezing and stridor.    Cardiovascular: Negative.    Gastrointestinal: Negative.  Negative for abdominal pain, diarrhea and vomiting.   Musculoskeletal:  Positive for myalgias.   Neurological:  Positive for headaches.       Medications, Allergies, and current problem list reviewed today in Epic             Objective     /70   Pulse 100   Temp 36.8 °C (98.3 °F) (Temporal)   Resp 19   Ht 1.524 m (5')   Wt 78.6 kg (173 lb 3.2 oz)   SpO2 100%   BMI 33.83 kg/m²      Physical Exam  Vitals and nursing note reviewed.   Constitutional:       General: She is not in acute distress.     Appearance: Normal appearance. She is well-developed. She is not ill-appearing, toxic-appearing or diaphoretic.   HENT:      Head: Normocephalic and atraumatic.      Right Ear: Tympanic membrane, ear canal and external ear normal.      Left Ear: Tympanic membrane, ear canal and external ear normal.      Nose: Rhinorrhea present. No congestion.      Mouth/Throat:       Mouth: Mucous membranes are moist.      Pharynx: Posterior oropharyngeal erythema present. No oropharyngeal exudate.   Eyes:      General:         Right eye: No discharge.         Left eye: No discharge.      Conjunctiva/sclera: Conjunctivae normal.   Cardiovascular:      Rate and Rhythm: Normal rate and regular rhythm.      Pulses: Normal pulses.      Heart sounds: Normal heart sounds.   Pulmonary:      Effort: Pulmonary effort is normal. No respiratory distress.      Breath sounds: Normal breath sounds. No wheezing, rhonchi or rales.   Musculoskeletal:      Cervical back: Normal range of motion and neck supple.      Right lower leg: No edema.      Left lower leg: No edema.   Lymphadenopathy:      Cervical: No cervical adenopathy.   Skin:     General: Skin is warm and dry.   Neurological:      General: No focal deficit present.      Mental Status: She is alert and oriented to person, place, and time. Mental status is at baseline.   Psychiatric:         Mood and Affect: Mood normal.         Behavior: Behavior normal.         Thought Content: Thought content normal.         Judgment: Judgment normal.                                  Assessment & Plan  Sore throat  Strep: Negative  COVID, flu, RSV: Negative    Orders:    POCT CEPHEID COV-2, FLU A/B, RSV - PCR    POCT CEPHEID GROUP A STREP - PCR    Nonspecific syndrome suggestive of viral illness  No clinical symptoms/signs of focal bacterial infection.  Patient will be treated for self-limiting viral URI with OTC meds, conservative measures, and symptomatic relief.  ER and red flag symptoms discussed at length.              I personally reviewed prior external notes and test results pertinent to today's visit. Return to clinic or go to ED if symptoms worsen or persist. Red flag symptoms and indications for ED discussed at length. Patient/Parent/Guardian voices understanding.  AVS with post-visit instructions printed and provided or given verbally.  Follow-up with  your primary care provider in 3-5 days. All side effects and potential interactions of prescribed medication discussed including allergic response, GI upset, tendon injury, rash, sedation, OCP effectiveness, etc.    Please note that this dictation was created using voice recognition software. I have made every reasonable attempt to correct obvious errors, but I expect that there are errors of grammar and possibly content that I did not discover before finalizing the note.

## 2025-04-04 ENCOUNTER — RESULTS FOLLOW-UP (OUTPATIENT)
Dept: URGENT CARE | Facility: PHYSICIAN GROUP | Age: 40
End: 2025-04-04

## 2025-04-04 ENCOUNTER — OFFICE VISIT (OUTPATIENT)
Dept: URGENT CARE | Facility: PHYSICIAN GROUP | Age: 40
End: 2025-04-04
Payer: COMMERCIAL

## 2025-04-04 VITALS
BODY MASS INDEX: 34.41 KG/M2 | WEIGHT: 175.27 LBS | SYSTOLIC BLOOD PRESSURE: 118 MMHG | RESPIRATION RATE: 20 BRPM | OXYGEN SATURATION: 97 % | HEART RATE: 80 BPM | DIASTOLIC BLOOD PRESSURE: 70 MMHG | TEMPERATURE: 97.2 F | HEIGHT: 60 IN

## 2025-04-04 DIAGNOSIS — M54.2 CHRONIC NECK PAIN: ICD-10-CM

## 2025-04-04 DIAGNOSIS — R42 DIZZINESS: ICD-10-CM

## 2025-04-04 DIAGNOSIS — J34.89 NASAL CONGESTION WITH RHINORRHEA: ICD-10-CM

## 2025-04-04 DIAGNOSIS — R09.81 NASAL CONGESTION WITH RHINORRHEA: ICD-10-CM

## 2025-04-04 DIAGNOSIS — R11.2 NAUSEA AND VOMITING, UNSPECIFIED VOMITING TYPE: ICD-10-CM

## 2025-04-04 DIAGNOSIS — G89.29 CHRONIC NECK PAIN: ICD-10-CM

## 2025-04-04 DIAGNOSIS — G43.801 OTHER MIGRAINE WITH STATUS MIGRAINOSUS, NOT INTRACTABLE: ICD-10-CM

## 2025-04-04 LAB
APPEARANCE UR: CLEAR
BILIRUB UR STRIP-MCNC: NEGATIVE MG/DL
COLOR UR AUTO: YELLOW
FLUAV RNA SPEC QL NAA+PROBE: NEGATIVE
FLUBV RNA SPEC QL NAA+PROBE: NEGATIVE
GLUCOSE UR STRIP.AUTO-MCNC: NEGATIVE MG/DL
KETONES UR STRIP.AUTO-MCNC: NEGATIVE MG/DL
LEUKOCYTE ESTERASE UR QL STRIP.AUTO: NEGATIVE
NITRITE UR QL STRIP.AUTO: NEGATIVE
PH UR STRIP.AUTO: 6.5 [PH] (ref 5–8)
POCT INT CON NEG: NEGATIVE
POCT INT CON POS: POSITIVE
POCT URINE PREGNANCY TEST: NEGATIVE
PROT UR QL STRIP: NEGATIVE MG/DL
RBC UR QL AUTO: NORMAL
RSV RNA SPEC QL NAA+PROBE: NEGATIVE
SARS-COV-2 RNA RESP QL NAA+PROBE: NEGATIVE
SP GR UR STRIP.AUTO: 1.02
UROBILINOGEN UR STRIP-MCNC: 0.2 MG/DL

## 2025-04-04 PROCEDURE — 81002 URINALYSIS NONAUTO W/O SCOPE: CPT | Performed by: NURSE PRACTITIONER

## 2025-04-04 PROCEDURE — 81025 URINE PREGNANCY TEST: CPT | Performed by: NURSE PRACTITIONER

## 2025-04-04 PROCEDURE — 87637 SARSCOV2&INF A&B&RSV AMP PRB: CPT | Mod: QW | Performed by: NURSE PRACTITIONER

## 2025-04-04 PROCEDURE — 99214 OFFICE O/P EST MOD 30 MIN: CPT | Performed by: NURSE PRACTITIONER

## 2025-04-04 PROCEDURE — 3078F DIAST BP <80 MM HG: CPT | Performed by: NURSE PRACTITIONER

## 2025-04-04 PROCEDURE — 3074F SYST BP LT 130 MM HG: CPT | Performed by: NURSE PRACTITIONER

## 2025-04-04 RX ORDER — ONDANSETRON 4 MG/1
4 TABLET, ORALLY DISINTEGRATING ORAL EVERY 6 HOURS PRN
Qty: 10 TABLET | Refills: 0 | Status: SHIPPED | OUTPATIENT
Start: 2025-04-04

## 2025-04-04 RX ORDER — HYDROXYZINE HYDROCHLORIDE 25 MG/1
TABLET, FILM COATED ORAL
COMMUNITY

## 2025-04-04 RX ORDER — BUPROPION HYDROCHLORIDE 100 MG/1
100 TABLET ORAL DAILY
COMMUNITY
Start: 2025-02-14 | End: 2025-05-15

## 2025-04-04 RX ORDER — KETOROLAC TROMETHAMINE 15 MG/ML
15 INJECTION, SOLUTION INTRAMUSCULAR; INTRAVENOUS ONCE
Status: COMPLETED | OUTPATIENT
Start: 2025-04-04 | End: 2025-04-04

## 2025-04-04 RX ORDER — ONDANSETRON 4 MG/1
4 TABLET, ORALLY DISINTEGRATING ORAL ONCE
Status: COMPLETED | OUTPATIENT
Start: 2025-04-04 | End: 2025-04-04

## 2025-04-04 RX ADMIN — ONDANSETRON 4 MG: 4 TABLET, ORALLY DISINTEGRATING ORAL at 17:14

## 2025-04-04 RX ADMIN — KETOROLAC TROMETHAMINE 15 MG: 15 INJECTION, SOLUTION INTRAMUSCULAR; INTRAVENOUS at 17:13

## 2025-04-04 ASSESSMENT — ENCOUNTER SYMPTOMS
FEVER: 0
VOMITING: 1
SORE THROAT: 0
SENSORY CHANGE: 0
PALPITATIONS: 0
SHORTNESS OF BREATH: 0
BLURRED VISION: 0
NECK PAIN: 1
PSYCHIATRIC NEGATIVE: 1
TINGLING: 0
SINUS PAIN: 0
DOUBLE VISION: 0
NAUSEA: 1
MYALGIAS: 1
CARDIOVASCULAR NEGATIVE: 1
DIARRHEA: 0
DIZZINESS: 1
COUGH: 0
WHEEZING: 0
PHOTOPHOBIA: 1
HEARTBURN: 0
WEAKNESS: 0
FOCAL WEAKNESS: 0
SPEECH CHANGE: 0
HEADACHES: 1
CHILLS: 0
ABDOMINAL PAIN: 0

## 2025-04-04 ASSESSMENT — FIBROSIS 4 INDEX: FIB4 SCORE: 0.72

## 2025-04-05 NOTE — PROGRESS NOTES
Subjective     Kayy Zavala is a 39 y.o. female who presents with Nausea (Feeling very deyrdated, dizzy, nausea ,vomit > Today > Note had stomach surgery ,3 yrs ago hard to eat or drink per patient )            Nausea  Associated symptoms include congestion, headaches, myalgias, nausea, neck pain and vomiting. Pertinent negatives include no abdominal pain, chest pain, chills, coughing, fever, sore throat or weakness.   Kayy has come into urgent care today as she has been experiencing severe vertigo, headache (migraine), nausea, vomiting, light sensitivity that started today.  States experiencing posterior neck pain which is radiating to her head causing migraine pain.  This is chronic for her.  She does have Robaxin but has not used.   has been referred to physical therapy for this but has not gone yet.  Takes over-the-counter Tylenol for migraine headache as needed but has not taken any of this.  Unable to keep down fluids today.   has been working out more recently and will feel dry in the mornings.  States his drinking fluids through the day before this episode.  History of vertigo and has been seen in urgent care for this with referral to physical therapy.  Denies any abdominal pain, dysuria or any noted diarrhea.  Denies any fever or upper respiratory symptoms.  Denies chest pain.    PMH:  has a past medical history of Asthma (03/14/2022), Fatty liver, Liver disease, Plaque psoriasis, Psoriasis, Psoriatic arthritis (HCC), Psychiatric disorder, Ruptured ovarian cyst, Sleep apnea, Snoring, and kendy.    She has no past medical history of Cancer (HCC) or Hypertension.  MEDS:   Current Outpatient Medications:     buPROPion (WELLBUTRIN) 100 MG Tab, Take 100 mg by mouth every day., Disp: , Rfl:     hydrOXYzine HCl (ATARAX) 25 MG Tab, 1 tab(s) orally PRN, Disp: , Rfl:     ondansetron (ZOFRAN ODT) 4 MG TABLET DISPERSIBLE, Take 1 Tablet by mouth every 6 hours as needed for Nausea/Vomiting., Disp: 10  Tablet, Rfl: 0    SKYRIZI  MG/ML Solution Auto-injector, Inject 150 mg as directed every 12 weeks., Disp: , Rfl:     azelastine (ASTELIN) 137 MCG/SPRAY nasal spray, Administer 1 Spray into affected nostril(S) 2 times a day., Disp: 30 mL, Rfl: 0    clobetasol (TEMOVATE) 0.05 % external solution, Apply  topically as needed (Uses on scalp - prn)., Disp: , Rfl:     betamethasone dipropionate (DIPROLENE) 0.05 % Ointment, Apply 1 Application topically every evening., Disp: , Rfl:     Loratadine (CLARITIN PO), Take 1 Tablet by mouth every day., Disp: , Rfl:     albuterol (VENTOLIN OR PROVENTIL) 108 (90 BASE) MCG/ACT AERS, Inhale 2 Puffs by mouth every 6 hours as needed for Shortness of Breath., Disp: 1 Inhaler, Rfl: 3    mometasone (NASONEX) 50 MCG/ACT nasal spray, Spray 2 Sprays in nose every day. Each nostril (Patient not taking: Reported on 2025), Disp: 1 Inhaler, Rfl: 1    Current Facility-Administered Medications:     ketorolac (Toradol) 15 MG/ML injection 15 mg, 15 mg, Intramuscular, Once,     ondansetron (Zofran ODT) dispertab 4 mg, 4 mg, Oral, Once,   ALLERGIES:   Allergies   Allergen Reactions    Ibuprofen      Has gastric sleeve.    Lamisil [Terbinafine Hcl]      hives    Lovenox [Enoxaparin]      SURGHX:   Past Surgical History:   Procedure Laterality Date    [place] LAP CHANTE RESTRICT PROC LONGITUDINAL GAS*  3/18/2022    Procedure: GASTRECTOMY, SLEEVE, LAPAROSCOPIC;  Surgeon: Simone Bauer M.D.;  Location: SURGERY [facility];  Service: General    ENDOSCOPY      GYN SURGERY      c section    [facility]  DELIVERY SER       SOCHX:  reports that she has never smoked. She has never used smokeless tobacco. She reports that she does not currently use alcohol. She reports that she does not use drugs.  FH: Family history was reviewed, no pertinent findings to report      Review of Systems   Constitutional:  Positive for malaise/fatigue. Negative for chills and fever.   HENT:  Positive for congestion and ear  pain. Negative for sinus pain and sore throat.    Eyes:  Positive for photophobia. Negative for blurred vision and double vision.   Respiratory:  Negative for cough, shortness of breath and wheezing.    Cardiovascular: Negative.  Negative for chest pain and palpitations.   Gastrointestinal:  Positive for nausea and vomiting. Negative for abdominal pain, diarrhea and heartburn.   Musculoskeletal:  Positive for myalgias and neck pain.   Neurological:  Positive for dizziness and headaches. Negative for tingling, sensory change, speech change, focal weakness and weakness.   Psychiatric/Behavioral: Negative.     All other systems reviewed and are negative.             Objective     /70 (BP Location: Left arm, Patient Position: Supine, BP Cuff Size: Adult)   Pulse 80   Temp 36.2 °C (97.2 °F) (Temporal)   Resp 20   Ht 1.524 m (5')   Wt 79.5 kg (175 lb 4.3 oz)   LMP  (LMP Unknown)   SpO2 97%   BMI 34.23 kg/m²      Physical Exam  Vitals reviewed.   Constitutional:       General: She is awake. She is not in acute distress.     Appearance: She is not ill-appearing, toxic-appearing or diaphoretic.      Comments: Appears fatigued.  Experiencing photosensitivity.   HENT:      Right Ear: Ear canal and external ear normal. A middle ear effusion is present.      Left Ear: Ear canal and external ear normal. A middle ear effusion is present.      Nose: Congestion and rhinorrhea present. Rhinorrhea is clear.      Mouth/Throat:      Lips: Pink.      Mouth: Mucous membranes are moist.      Pharynx: Oropharynx is clear. Uvula midline.   Eyes:      Pupils: Pupils are equal, round, and reactive to light.   Neck:     Cardiovascular:      Rate and Rhythm: Normal rate and regular rhythm.      Heart sounds: Normal heart sounds, S1 normal and S2 normal.   Pulmonary:      Effort: Pulmonary effort is normal. No tachypnea, accessory muscle usage or respiratory distress.      Breath sounds: Normal breath sounds and air entry.    Musculoskeletal:      Cervical back: No edema, erythema, signs of trauma, rigidity, torticollis or crepitus. Pain with movement and muscular tenderness present. No spinous process tenderness. Decreased range of motion.   Skin:     General: Skin is warm and dry.   Neurological:      Mental Status: She is alert and oriented to person, place, and time.      GCS: GCS eye subscore is 4. GCS verbal subscore is 5. GCS motor subscore is 6.      Cranial Nerves: Cranial nerves 2-12 are intact.   Psychiatric:         Attention and Perception: Attention and perception normal.         Mood and Affect: Mood and affect normal.         Speech: Speech normal.         Behavior: Behavior normal. Behavior is cooperative.         Thought Content: Thought content normal.         Cognition and Memory: Cognition and memory normal.         Judgment: Judgment normal.                                  Assessment & Plan  Dizziness    Orders:    POCT Urinalysis    POCT Pregnancy  -Manage nasal congestion that is contributing to inner ear fluid that could exacerbate dizziness/vertigo  -No need for CT scan as symptoms appear to be associated with migraine  Other migraine with status migrainosus, not intractable    Orders:    ketorolac (Toradol) 15 MG/ML injection 15 mg    POCT CoV-2, Flu A/B, RSV by PCR  -Avoid headache triggers like excessive caffeine, high sugar intake, high salty foods, alcohol, stress or lack of sleep  - Maintain hydration/water intake  -Encourage rest  -Treat headache symptoms early to prevent severity, rest in dark cool place without distraction or noise  -Monitor for headache not being relieved with treatment, nausea/vomiting, weakness, change in mental status- call 911, must go to emergency room    Chronic neck pain  - Recommend over-the-counter Tylenol as needed   - Recommend heat application to help muscle spasm  -Recommend to take methocarbamol as directed  -Recommend to contact physical therapy as this is a chronic  condition  -Discussed most likely contributing to headache (migraine status)       Nausea and vomiting, unspecified vomiting type    Orders:    ondansetron (Zofran ODT) dispertab 4 mg    ondansetron (ZOFRAN ODT) 4 MG TABLET DISPERSIBLE; Take 1 Tablet by mouth every 6 hours as needed for Nausea/Vomiting.    POCT CoV-2, Flu A/B, RSV by PCR  -Maintain water status slowly with sips of water and electrolyte fluid, increase to larger amounts as tolerated  -Introduce solid foods when vomiting ceases. Eat items from the BRAT diet- trying small amount with one item at a time  - Encourage rest  -Monitor for fever, increased abdominal pain, nausea/vomiting, lethargy, unable to retain fluids/meds- need re-evaluation-May need to go to emergency room for IV fluid replacement    Nasal congestion with rhinorrhea    Orders:    POCT CoV-2, Flu A/B, RSV by PCR  -Recommend to take Flonase spray daily for fluid behind eardrums  -Commend to take daily antihistamine without decongestant like Claritin or Zyrtec as well   -Discussed results to patient and will contact via [employer] for viral results-patient is okay with this  -Patient states will opt to go to the emergency room for IV fluids and has made an appointment for this    -Education provided: see above    -Return to urgent care as needed if new or worsening symptoms  -Patient expresses understanding to treatment and plan of care  -Questions were encouraged and answered  -Recommended over the counter meds were written down when requested   -Advised to follow-up with the primary care provider for recheck, reevaluation, and consideration of further management as needed     -Time spent evaluating this patient was at least 30 minutes. This time comprises of the following: preparing for visit/chart review, patient history taken into account pertinent to symptoms, patient counseling/education for needed treatment outpatient, exam/evaluation/treatment plan provided, ordering any appropriate  lab/test/procedures/meds, independent interpretation of any clinic testing, medication management with any other listed medications and chart documentation.

## 2025-07-29 ENCOUNTER — HOSPITAL ENCOUNTER (OUTPATIENT)
Dept: LAB | Facility: MEDICAL CENTER | Age: 40
End: 2025-07-29
Attending: NURSE PRACTITIONER
Payer: COMMERCIAL

## 2025-07-29 ENCOUNTER — HOSPITAL ENCOUNTER (OUTPATIENT)
Dept: LAB | Facility: MEDICAL CENTER | Age: 40
End: 2025-07-29
Payer: COMMERCIAL

## 2025-07-29 LAB
25(OH)D3 SERPL-MCNC: 30 NG/ML (ref 30–100)
25(OH)D3 SERPL-MCNC: 33 NG/ML (ref 30–100)
ALBUMIN SERPL BCP-MCNC: 4.4 G/DL (ref 3.2–4.9)
ALBUMIN SERPL BCP-MCNC: 4.6 G/DL (ref 3.2–4.9)
ALBUMIN/GLOB SERPL: 1.3 G/DL
ALBUMIN/GLOB SERPL: 1.4 G/DL
ALP SERPL-CCNC: 73 U/L (ref 30–99)
ALP SERPL-CCNC: 75 U/L (ref 30–99)
ALT SERPL-CCNC: 26 U/L (ref 2–50)
ALT SERPL-CCNC: 26 U/L (ref 2–50)
ANION GAP SERPL CALC-SCNC: 11 MMOL/L (ref 7–16)
ANION GAP SERPL CALC-SCNC: 14 MMOL/L (ref 7–16)
AST SERPL-CCNC: 20 U/L (ref 12–45)
AST SERPL-CCNC: 21 U/L (ref 12–45)
BASOPHILS # BLD AUTO: 0.3 % (ref 0–1.8)
BASOPHILS # BLD AUTO: 0.3 % (ref 0–1.8)
BASOPHILS # BLD: 0.02 K/UL (ref 0–0.12)
BASOPHILS # BLD: 0.02 K/UL (ref 0–0.12)
BILIRUB SERPL-MCNC: 0.5 MG/DL (ref 0.1–1.5)
BILIRUB SERPL-MCNC: 0.6 MG/DL (ref 0.1–1.5)
BUN SERPL-MCNC: 15 MG/DL (ref 8–22)
BUN SERPL-MCNC: 15 MG/DL (ref 8–22)
CALCIUM ALBUM COR SERPL-MCNC: 9 MG/DL (ref 8.5–10.5)
CALCIUM ALBUM COR SERPL-MCNC: 9.3 MG/DL (ref 8.5–10.5)
CALCIUM SERPL-MCNC: 9.5 MG/DL (ref 8.5–10.5)
CALCIUM SERPL-MCNC: 9.6 MG/DL (ref 8.5–10.5)
CHLORIDE SERPL-SCNC: 103 MMOL/L (ref 96–112)
CHLORIDE SERPL-SCNC: 104 MMOL/L (ref 96–112)
CHOLEST SERPL-MCNC: 182 MG/DL (ref 100–199)
CHOLEST SERPL-MCNC: 184 MG/DL (ref 100–199)
CO2 SERPL-SCNC: 21 MMOL/L (ref 20–33)
CO2 SERPL-SCNC: 23 MMOL/L (ref 20–33)
CREAT SERPL-MCNC: 0.62 MG/DL (ref 0.5–1.4)
CREAT SERPL-MCNC: 0.63 MG/DL (ref 0.5–1.4)
EOSINOPHIL # BLD AUTO: 0.13 K/UL (ref 0–0.51)
EOSINOPHIL # BLD AUTO: 0.14 K/UL (ref 0–0.51)
EOSINOPHIL NFR BLD: 2.1 % (ref 0–6.9)
EOSINOPHIL NFR BLD: 2.2 % (ref 0–6.9)
ERYTHROCYTE [DISTWIDTH] IN BLOOD BY AUTOMATED COUNT: 42.3 FL (ref 35.9–50)
ERYTHROCYTE [DISTWIDTH] IN BLOOD BY AUTOMATED COUNT: 42.8 FL (ref 35.9–50)
EST. AVERAGE GLUCOSE BLD GHB EST-MCNC: 105 MG/DL
FERRITIN SERPL-MCNC: 147 NG/ML (ref 10–291)
FOLATE SERPL-MCNC: 5.7 NG/ML
GFR SERPLBLD CREATININE-BSD FMLA CKD-EPI: 117 ML/MIN/1.73 M 2
GFR SERPLBLD CREATININE-BSD FMLA CKD-EPI: 117 ML/MIN/1.73 M 2
GLOBULIN SER CALC-MCNC: 3.3 G/DL (ref 1.9–3.5)
GLOBULIN SER CALC-MCNC: 3.4 G/DL (ref 1.9–3.5)
GLUCOSE SERPL-MCNC: 82 MG/DL (ref 65–99)
GLUCOSE SERPL-MCNC: 83 MG/DL (ref 65–99)
HBA1C MFR BLD: 5.3 % (ref 4–5.6)
HCT VFR BLD AUTO: 42.7 % (ref 37–47)
HCT VFR BLD AUTO: 44.5 % (ref 37–47)
HDLC SERPL-MCNC: 47 MG/DL
HDLC SERPL-MCNC: 48 MG/DL
HGB BLD-MCNC: 13.8 G/DL (ref 12–16)
HGB BLD-MCNC: 14.6 G/DL (ref 12–16)
IMM GRANULOCYTES # BLD AUTO: 0.01 K/UL (ref 0–0.11)
IMM GRANULOCYTES # BLD AUTO: 0.02 K/UL (ref 0–0.11)
IMM GRANULOCYTES NFR BLD AUTO: 0.2 % (ref 0–0.9)
IMM GRANULOCYTES NFR BLD AUTO: 0.3 % (ref 0–0.9)
IRON SERPL-MCNC: 124 UG/DL (ref 40–170)
LDLC SERPL CALC-MCNC: 111 MG/DL
LDLC SERPL CALC-MCNC: 111 MG/DL
LYMPHOCYTES # BLD AUTO: 2.2 K/UL (ref 1–4.8)
LYMPHOCYTES # BLD AUTO: 2.2 K/UL (ref 1–4.8)
LYMPHOCYTES NFR BLD: 34.8 % (ref 22–41)
LYMPHOCYTES NFR BLD: 35.6 % (ref 22–41)
MCH RBC QN AUTO: 29.8 PG (ref 27–33)
MCH RBC QN AUTO: 30.5 PG (ref 27–33)
MCHC RBC AUTO-ENTMCNC: 32.3 G/DL (ref 32.2–35.5)
MCHC RBC AUTO-ENTMCNC: 32.8 G/DL (ref 32.2–35.5)
MCV RBC AUTO: 92.2 FL (ref 81.4–97.8)
MCV RBC AUTO: 92.9 FL (ref 81.4–97.8)
MONOCYTES # BLD AUTO: 0.41 K/UL (ref 0–0.85)
MONOCYTES # BLD AUTO: 0.46 K/UL (ref 0–0.85)
MONOCYTES NFR BLD AUTO: 6.6 % (ref 0–13.4)
MONOCYTES NFR BLD AUTO: 7.3 % (ref 0–13.4)
NEUTROPHILS # BLD AUTO: 3.4 K/UL (ref 1.82–7.42)
NEUTROPHILS # BLD AUTO: 3.5 K/UL (ref 1.82–7.42)
NEUTROPHILS NFR BLD: 55.1 % (ref 44–72)
NEUTROPHILS NFR BLD: 55.2 % (ref 44–72)
NRBC # BLD AUTO: 0 K/UL
NRBC # BLD AUTO: 0 K/UL
NRBC BLD-RTO: 0 /100 WBC (ref 0–0.2)
NRBC BLD-RTO: 0 /100 WBC (ref 0–0.2)
PLATELET # BLD AUTO: 237 K/UL (ref 164–446)
PLATELET # BLD AUTO: 245 K/UL (ref 164–446)
PMV BLD AUTO: 10.1 FL (ref 9–12.9)
PMV BLD AUTO: 9.9 FL (ref 9–12.9)
POTASSIUM SERPL-SCNC: 3.9 MMOL/L (ref 3.6–5.5)
POTASSIUM SERPL-SCNC: 4.2 MMOL/L (ref 3.6–5.5)
PREALB SERPL-MCNC: 24.1 MG/DL (ref 18–38)
PROT SERPL-MCNC: 7.8 G/DL (ref 6–8.2)
PROT SERPL-MCNC: 7.9 G/DL (ref 6–8.2)
RBC # BLD AUTO: 4.63 M/UL (ref 4.2–5.4)
RBC # BLD AUTO: 4.79 M/UL (ref 4.2–5.4)
SODIUM SERPL-SCNC: 137 MMOL/L (ref 135–145)
SODIUM SERPL-SCNC: 139 MMOL/L (ref 135–145)
TRANSFERRIN SERPL-MCNC: 260 MG/DL (ref 200–370)
TRIGL SERPL-MCNC: 121 MG/DL (ref 0–149)
TRIGL SERPL-MCNC: 126 MG/DL (ref 0–149)
VIT B12 SERPL-MCNC: 677 PG/ML (ref 211–911)
WBC # BLD AUTO: 6.2 K/UL (ref 4.8–10.8)
WBC # BLD AUTO: 6.3 K/UL (ref 4.8–10.8)

## 2025-07-29 PROCEDURE — 84207 ASSAY OF VITAMIN B-6: CPT

## 2025-07-29 PROCEDURE — 85025 COMPLETE CBC W/AUTO DIFF WBC: CPT | Mod: 91

## 2025-07-29 PROCEDURE — 85025 COMPLETE CBC W/AUTO DIFF WBC: CPT

## 2025-07-29 PROCEDURE — 80053 COMPREHEN METABOLIC PANEL: CPT

## 2025-07-29 PROCEDURE — 82306 VITAMIN D 25 HYDROXY: CPT | Mod: 91

## 2025-07-29 PROCEDURE — 36415 COLL VENOUS BLD VENIPUNCTURE: CPT

## 2025-07-29 PROCEDURE — 82306 VITAMIN D 25 HYDROXY: CPT

## 2025-07-29 PROCEDURE — 83036 HEMOGLOBIN GLYCOSYLATED A1C: CPT

## 2025-07-29 PROCEDURE — 83540 ASSAY OF IRON: CPT

## 2025-07-29 PROCEDURE — 82607 VITAMIN B-12: CPT

## 2025-07-29 PROCEDURE — 80053 COMPREHEN METABOLIC PANEL: CPT | Mod: 91

## 2025-07-29 PROCEDURE — 84466 ASSAY OF TRANSFERRIN: CPT

## 2025-07-29 PROCEDURE — 82746 ASSAY OF FOLIC ACID SERUM: CPT

## 2025-07-29 PROCEDURE — 84252 ASSAY OF VITAMIN B-2: CPT

## 2025-07-29 PROCEDURE — 84630 ASSAY OF ZINC: CPT

## 2025-07-29 PROCEDURE — 86480 TB TEST CELL IMMUN MEASURE: CPT

## 2025-07-29 PROCEDURE — 80061 LIPID PANEL: CPT | Mod: 91

## 2025-07-29 PROCEDURE — 84425 ASSAY OF VITAMIN B-1: CPT

## 2025-07-29 PROCEDURE — 82728 ASSAY OF FERRITIN: CPT

## 2025-07-29 PROCEDURE — 84134 ASSAY OF PREALBUMIN: CPT

## 2025-07-29 PROCEDURE — 80061 LIPID PANEL: CPT

## 2025-07-30 LAB
GAMMA INTERFERON BACKGROUND BLD IA-ACNC: 0.03 IU/ML
M TB IFN-G BLD-IMP: NEGATIVE
M TB IFN-G CD4+ BCKGRND COR BLD-ACNC: -0.01 IU/ML
MITOGEN IGNF BCKGRD COR BLD-ACNC: >10 IU/ML
QFT TB2 - NIL TBQ2: 0.09 IU/ML

## 2025-08-01 LAB — ZINC SERPL-MCNC: 73.8 UG/DL (ref 60–120)

## 2025-08-02 LAB
VIT B1 BLD-MCNC: 117 NMOL/L (ref 70–180)
VIT B2 SERPL-SCNC: 14 NMOL/L (ref 5–50)
VIT B6 SERPL-MCNC: 26.9 NMOL/L (ref 20–125)

## 2025-08-13 PROCEDURE — 99283 EMERGENCY DEPT VISIT LOW MDM: CPT | Mod: EDC

## 2025-08-14 ENCOUNTER — HOSPITAL ENCOUNTER (EMERGENCY)
Facility: MEDICAL CENTER | Age: 40
End: 2025-08-14
Attending: EMERGENCY MEDICINE
Payer: COMMERCIAL

## 2025-08-14 VITALS
WEIGHT: 177.69 LBS | SYSTOLIC BLOOD PRESSURE: 149 MMHG | DIASTOLIC BLOOD PRESSURE: 85 MMHG | OXYGEN SATURATION: 98 % | BODY MASS INDEX: 34.89 KG/M2 | HEIGHT: 60 IN | HEART RATE: 76 BPM | RESPIRATION RATE: 18 BRPM | TEMPERATURE: 98.4 F

## 2025-08-14 DIAGNOSIS — Z77.21 EXPOSURE TO BLOOD: Primary | ICD-10-CM

## 2025-08-14 LAB
ALBUMIN SERPL BCP-MCNC: 4.4 G/DL (ref 3.2–4.9)
ALBUMIN/GLOB SERPL: 1.4 G/DL
ALP SERPL-CCNC: 78 U/L (ref 30–99)
ALT SERPL-CCNC: 13 U/L (ref 2–50)
ANION GAP SERPL CALC-SCNC: 12 MMOL/L (ref 7–16)
AST SERPL-CCNC: 15 U/L (ref 12–45)
BILIRUB SERPL-MCNC: 0.5 MG/DL (ref 0.1–1.5)
BUN SERPL-MCNC: 17 MG/DL (ref 8–22)
CALCIUM ALBUM COR SERPL-MCNC: 9.1 MG/DL (ref 8.5–10.5)
CALCIUM SERPL-MCNC: 9.4 MG/DL (ref 8.5–10.5)
CHLORIDE SERPL-SCNC: 103 MMOL/L (ref 96–112)
CO2 SERPL-SCNC: 23 MMOL/L (ref 20–33)
CREAT SERPL-MCNC: 0.67 MG/DL (ref 0.5–1.4)
ERYTHROCYTE [DISTWIDTH] IN BLOOD BY AUTOMATED COUNT: 42 FL (ref 35.9–50)
GFR SERPLBLD CREATININE-BSD FMLA CKD-EPI: 115 ML/MIN/1.73 M 2
GLOBULIN SER CALC-MCNC: 3.2 G/DL (ref 1.9–3.5)
GLUCOSE SERPL-MCNC: 99 MG/DL (ref 65–99)
HBV CORE AB SERPL QL IA: NONREACTIVE
HBV SURFACE AB SERPL IA-ACNC: 5060 MIU/ML (ref 0–10)
HBV SURFACE AG SER QL: ABNORMAL
HCT VFR BLD AUTO: 43 % (ref 37–47)
HCV AB SER QL: ABNORMAL
HGB BLD-MCNC: 14.6 G/DL (ref 12–16)
HIV 1+2 AB+HIV1 P24 AG SERPL QL IA: ABNORMAL
MCH RBC QN AUTO: 31 PG (ref 27–33)
MCHC RBC AUTO-ENTMCNC: 34 G/DL (ref 32.2–35.5)
MCV RBC AUTO: 91.3 FL (ref 81.4–97.8)
PLATELET # BLD AUTO: 235 K/UL (ref 164–446)
PMV BLD AUTO: 9.9 FL (ref 9–12.9)
POTASSIUM SERPL-SCNC: 3.9 MMOL/L (ref 3.6–5.5)
PROT SERPL-MCNC: 7.6 G/DL (ref 6–8.2)
RBC # BLD AUTO: 4.71 M/UL (ref 4.2–5.4)
SODIUM SERPL-SCNC: 138 MMOL/L (ref 135–145)
WBC # BLD AUTO: 7.9 K/UL (ref 4.8–10.8)

## 2025-08-14 PROCEDURE — 87340 HEPATITIS B SURFACE AG IA: CPT

## 2025-08-14 PROCEDURE — 85027 COMPLETE CBC AUTOMATED: CPT

## 2025-08-14 PROCEDURE — 86706 HEP B SURFACE ANTIBODY: CPT

## 2025-08-14 PROCEDURE — 86704 HEP B CORE ANTIBODY TOTAL: CPT

## 2025-08-14 PROCEDURE — 87389 HIV-1 AG W/HIV-1&-2 AB AG IA: CPT

## 2025-08-14 PROCEDURE — 80053 COMPREHEN METABOLIC PANEL: CPT

## 2025-08-14 PROCEDURE — 86803 HEPATITIS C AB TEST: CPT

## 2025-08-14 PROCEDURE — 36415 COLL VENOUS BLD VENIPUNCTURE: CPT | Mod: EDC

## 2025-08-14 ASSESSMENT — LIFESTYLE VARIABLES: HOW OFTEN DO YOU HAVE A DRINK CONTAINING ALCOHOL: 2-4 TIMES A MONTH

## 2025-08-14 ASSESSMENT — FIBROSIS 4 INDEX: FIB4 SCORE: 0.61

## 2025-08-30 ENCOUNTER — OFFICE VISIT (OUTPATIENT)
Dept: URGENT CARE | Facility: PHYSICIAN GROUP | Age: 40
End: 2025-08-30
Payer: COMMERCIAL

## 2025-08-30 VITALS
DIASTOLIC BLOOD PRESSURE: 74 MMHG | RESPIRATION RATE: 16 BRPM | WEIGHT: 176 LBS | HEART RATE: 90 BPM | OXYGEN SATURATION: 98 % | TEMPERATURE: 99.3 F | BODY MASS INDEX: 34.37 KG/M2 | SYSTOLIC BLOOD PRESSURE: 110 MMHG

## 2025-08-30 DIAGNOSIS — L50.9 URTICARIA: Primary | ICD-10-CM

## 2025-08-30 DIAGNOSIS — L50.9 LOCALIZED HIVES: ICD-10-CM

## 2025-08-30 DIAGNOSIS — Z88.9 HISTORY OF ALLERGIC DRUG REACTION: ICD-10-CM

## 2025-08-30 RX ORDER — DIPHENHYDRAMINE HYDROCHLORIDE 50 MG/ML
50 INJECTION, SOLUTION INTRAMUSCULAR; INTRAVENOUS ONCE
Status: COMPLETED | OUTPATIENT
Start: 2025-08-30 | End: 2025-08-30

## 2025-08-30 RX ORDER — EPINEPHRINE 0.3 MG/.3ML
0.3 INJECTION SUBCUTANEOUS ONCE
Qty: 1 EACH | Refills: 1 | Status: SHIPPED | OUTPATIENT
Start: 2025-08-30 | End: 2025-08-30

## 2025-08-30 RX ADMIN — DIPHENHYDRAMINE HYDROCHLORIDE 50 MG: 50 INJECTION, SOLUTION INTRAMUSCULAR; INTRAVENOUS at 13:06

## 2025-08-30 ASSESSMENT — ENCOUNTER SYMPTOMS
FEVER: 0
BRUISES/BLEEDS EASILY: 0
VOMITING: 0
BLOOD IN STOOL: 0
EYE REDNESS: 0
DIARRHEA: 0
EYE DISCHARGE: 0
CONSTIPATION: 0
WHEEZING: 0
COUGH: 0

## 2025-08-30 ASSESSMENT — FIBROSIS 4 INDEX: FIB4 SCORE: 0.66

## (undated) DEVICE — GLOVE BIOGEL PI INDICATOR SZ 7.0 SURGICAL PF LF - (50/BX 4BX/CA)

## (undated) DEVICE — RELOAD WITH GRIPPING SURGACE TECHNOLOGY GREEN 60MM (12EA/BX)

## (undated) DEVICE — SODIUM CHL IRRIGATION 0.9% 1000ML (12EA/CA)

## (undated) DEVICE — SUTURE GENERAL

## (undated) DEVICE — GLOVE SZ 7 BIOGEL PI MICRO - PF LF (50PR/BX 4BX/CA)

## (undated) DEVICE — ELECTRODE DUAL RETURN W/ CORD - (50/PK)

## (undated) DEVICE — CLIP APPLIER 10MM ENDO LARGE (3EA/BX)

## (undated) DEVICE — PROTECTOR ULNA NERVE - (36PR/CA)

## (undated) DEVICE — PERISTRIP 60 STAPLE LINE REINFORCEMENT (6EA/CA)

## (undated) DEVICE — GOWN WARMING STANDARD FLEX - (30/CA)

## (undated) DEVICE — SLEEVE, VASO, REPROC, LARGE

## (undated) DEVICE — MAT PATIENT POSITIONING PREVALON (10EA/CA)

## (undated) DEVICE — NEPTUNE 4 PORT MANIFOLD - (20/PK)

## (undated) DEVICE — SUTURE 4-0 VICRYL PLUSFS-1 - 27 INCH (36/BX)

## (undated) DEVICE — SYSTEM CALIBARATION  GASTRECTOMY 40FR WITH BULB (5/BX)

## (undated) DEVICE — CANISTER SUCTION 3000ML MECHANICAL FILTER AUTO SHUTOFF MEDI-VAC NONSTERILE LF DISP  (40EA/CA)

## (undated) DEVICE — TISSEEL 4ML ----MUST ORDER A MIN OF 6EA----

## (undated) DEVICE — RELOAD WITH GRIPPING SURFACE TECHNOLOGY GOLD 60MM (12EA/BX)

## (undated) DEVICE — SUCTION INSTRUMENT YANKAUER BULBOUS TIP W/O VENT (50EA/CA)

## (undated) DEVICE — TUBING CLEARLINK DUO-VENT - C-FLO (48EA/CA)

## (undated) DEVICE — RELOAD WITH GRIPPING SURFACE TECHNOLOGY BLUE 60MM (12EA/BX)

## (undated) DEVICE — STAPLER POWERED 60MM (3EA/BX)

## (undated) DEVICE — DRAPESURG STERI-DRAPE LONG - (10/BX 4BX/CA)

## (undated) DEVICE — GOWN WARMING X-LARGE FLEX - (20/CA)

## (undated) DEVICE — BAG RETRIEVAL 12/15 MM INZII (5EA/CA) THIS WILL REPLACE ITEM 75018

## (undated) DEVICE — KIT ANESTHESIA W/CIRCUIT & 3/LT BAG W/FILTER (20EA/CA)

## (undated) DEVICE — APPLICATOR DUPLO SPRAYER (5EA/CA)

## (undated) DEVICE — SUTURE 0 LIGATING REEL VICRYL PLUS (12PK/BX)

## (undated) DEVICE — SET TUBING PNEUMOCLEAR HIGH FLOW SMOKE EVACUATION (10EA/BX)

## (undated) DEVICE — MASK ANESTHESIA ADULT  - (100/CA)

## (undated) DEVICE — ELECTRODE 850 FOAM ADHESIVE - HYDROGEL RADIOTRNSPRNT (50/PK)

## (undated) DEVICE — CANNULA W/SEAL 5X100 Z-THRE - ADED KII (12/BX)

## (undated) DEVICE — PACK GASTRIC BANDING OR - (1/CA)

## (undated) DEVICE — TROCAR 5X100 NON BLADED Z-TH - READ KII (6/BX)

## (undated) DEVICE — LACTATED RINGERS INJ 1000 ML - (14EA/CA 60CA/PF)

## (undated) DEVICE — HEAD HOLDER JUNIOR/ADULT

## (undated) DEVICE — SUTURE2-0 27IN VCRL ANTI VIOL (36PK/BX)

## (undated) DEVICE — TROCAR SEPARATOR 15MMZTHREAD - (6/BX)

## (undated) DEVICE — SET EXTENSION WITH 2 PORTS (48EA/CA) ***PART #2C8610 IS A SUBSTITUTE*****

## (undated) DEVICE — SENSOR SPO2 NEO LNCS ADHESIVE (20/BX) SEE USER NOTES

## (undated) DEVICE — TOWEL STOP TIMEOUT SAFETY FLAG (40EA/CA)

## (undated) DEVICE — SET LEADWIRE 5 LEAD BEDSIDE DISPOSABLE ECG (1SET OF 5/EA)

## (undated) DEVICE — CHLORAPREP 26 ML APPLICATOR - ORANGE TINT(25/CA)